# Patient Record
Sex: MALE | Race: WHITE | NOT HISPANIC OR LATINO | Employment: OTHER
[De-identification: names, ages, dates, MRNs, and addresses within clinical notes are randomized per-mention and may not be internally consistent; named-entity substitution may affect disease eponyms.]

---

## 2017-02-13 ENCOUNTER — RECORDS - HEALTHEAST (OUTPATIENT)
Dept: ADMINISTRATIVE | Facility: OTHER | Age: 67
End: 2017-02-13

## 2017-02-14 ENCOUNTER — RECORDS - HEALTHEAST (OUTPATIENT)
Dept: ADMINISTRATIVE | Facility: OTHER | Age: 67
End: 2017-02-14

## 2017-04-21 ENCOUNTER — RECORDS - HEALTHEAST (OUTPATIENT)
Dept: ADMINISTRATIVE | Facility: OTHER | Age: 67
End: 2017-04-21

## 2017-06-26 ENCOUNTER — AMBULATORY - HEALTHEAST (OUTPATIENT)
Dept: INFUSION THERAPY | Facility: CLINIC | Age: 67
End: 2017-06-26

## 2017-06-26 ENCOUNTER — OFFICE VISIT - HEALTHEAST (OUTPATIENT)
Dept: ONCOLOGY | Facility: CLINIC | Age: 67
End: 2017-06-26

## 2017-06-26 DIAGNOSIS — C91.10 CHRONIC LYMPHOID LEUKEMIA (H): ICD-10-CM

## 2017-06-26 DIAGNOSIS — C91.10 CHRONIC LYMPHOCYTIC LEUKEMIA (H): ICD-10-CM

## 2017-06-26 ASSESSMENT — MIFFLIN-ST. JEOR: SCORE: 1708.82

## 2017-07-03 ENCOUNTER — RECORDS - HEALTHEAST (OUTPATIENT)
Dept: ADMINISTRATIVE | Facility: OTHER | Age: 67
End: 2017-07-03

## 2017-08-29 ENCOUNTER — RECORDS - HEALTHEAST (OUTPATIENT)
Dept: ADMINISTRATIVE | Facility: OTHER | Age: 67
End: 2017-08-29

## 2017-09-21 ENCOUNTER — RECORDS - HEALTHEAST (OUTPATIENT)
Dept: ADMINISTRATIVE | Facility: OTHER | Age: 67
End: 2017-09-21

## 2017-11-29 ENCOUNTER — RECORDS - HEALTHEAST (OUTPATIENT)
Dept: ADMINISTRATIVE | Facility: OTHER | Age: 67
End: 2017-11-29

## 2018-01-15 ENCOUNTER — OFFICE VISIT - HEALTHEAST (OUTPATIENT)
Dept: ONCOLOGY | Facility: CLINIC | Age: 68
End: 2018-01-15

## 2018-01-15 ENCOUNTER — AMBULATORY - HEALTHEAST (OUTPATIENT)
Dept: INFUSION THERAPY | Facility: CLINIC | Age: 68
End: 2018-01-15

## 2018-01-15 DIAGNOSIS — C91.10 CHRONIC LYMPHOCYTIC LEUKEMIA (H): ICD-10-CM

## 2018-01-15 DIAGNOSIS — C91.10 CLL (CHRONIC LYMPHOCYTIC LEUKEMIA) (H): ICD-10-CM

## 2018-01-15 LAB
ALBUMIN SERPL-MCNC: 3.6 G/DL (ref 3.5–5)
ALP SERPL-CCNC: 82 U/L (ref 45–120)
ALT SERPL W P-5'-P-CCNC: 19 U/L (ref 0–45)
ANION GAP SERPL CALCULATED.3IONS-SCNC: 7 MMOL/L (ref 5–18)
AST SERPL W P-5'-P-CCNC: 14 U/L (ref 0–40)
BASOPHILS # BLD AUTO: 0 THOU/UL (ref 0–0.2)
BASOPHILS NFR BLD AUTO: 0 % (ref 0–2)
BILIRUB SERPL-MCNC: 0.5 MG/DL (ref 0–1)
BUN SERPL-MCNC: 10 MG/DL (ref 8–22)
CALCIUM SERPL-MCNC: 9.9 MG/DL (ref 8.5–10.5)
CHLORIDE BLD-SCNC: 103 MMOL/L (ref 98–107)
CO2 SERPL-SCNC: 28 MMOL/L (ref 22–31)
CREAT SERPL-MCNC: 0.76 MG/DL (ref 0.7–1.3)
EOSINOPHIL COUNT (ABSOLUTE): 0 THOU/UL (ref 0–0.4)
EOSINOPHIL NFR BLD AUTO: 0 % (ref 0–6)
ERYTHROCYTE [DISTWIDTH] IN BLOOD BY AUTOMATED COUNT: 14.2 % (ref 11–14.5)
GFR SERPL CREATININE-BSD FRML MDRD: >60 ML/MIN/1.73M2
GLUCOSE BLD-MCNC: 99 MG/DL (ref 70–125)
HCT VFR BLD AUTO: 37.8 % (ref 40–54)
HGB BLD-MCNC: 12.1 G/DL (ref 14–18)
LYMPHOCYTES # BLD AUTO: 31.1 THOU/UL (ref 0.8–4.4)
LYMPHOCYTES NFR BLD AUTO: 80 % (ref 20–40)
MCH RBC QN AUTO: 29.5 PG (ref 27–34)
MCHC RBC AUTO-ENTMCNC: 32 G/DL (ref 32–36)
MCV RBC AUTO: 92 FL (ref 80–100)
MONOCYTES # BLD AUTO: 1.2 THOU/UL (ref 0–0.9)
MONOCYTES NFR BLD AUTO: 3 % (ref 2–10)
PLAT MORPH BLD: NORMAL
PLATELET # BLD AUTO: 181 THOU/UL (ref 140–440)
PMV BLD AUTO: 9.1 FL (ref 8.5–12.5)
POTASSIUM BLD-SCNC: 4.6 MMOL/L (ref 3.5–5)
PROT SERPL-MCNC: 6.7 G/DL (ref 6–8)
RBC # BLD AUTO: 4.1 MILL/UL (ref 4.4–6.2)
REACTIVE LYMPHS: ABNORMAL
SODIUM SERPL-SCNC: 138 MMOL/L (ref 136–145)
TOTAL NEUTROPHILS-ABS(DIFF): 6.6 THOU/UL (ref 2–7.7)
TOTAL NEUTROPHILS-REL(DIFF): 17 % (ref 50–70)
WBC: 38.9 THOU/UL (ref 4–11)

## 2018-01-29 ENCOUNTER — RECORDS - HEALTHEAST (OUTPATIENT)
Dept: ADMINISTRATIVE | Facility: OTHER | Age: 68
End: 2018-01-29

## 2018-02-13 ENCOUNTER — RECORDS - HEALTHEAST (OUTPATIENT)
Dept: ADMINISTRATIVE | Facility: OTHER | Age: 68
End: 2018-02-13

## 2018-03-13 ENCOUNTER — RECORDS - HEALTHEAST (OUTPATIENT)
Dept: ADMINISTRATIVE | Facility: OTHER | Age: 68
End: 2018-03-13

## 2018-06-18 ENCOUNTER — OFFICE VISIT - HEALTHEAST (OUTPATIENT)
Dept: FAMILY MEDICINE | Facility: CLINIC | Age: 68
End: 2018-06-18

## 2018-06-18 DIAGNOSIS — H61.23 BILATERAL IMPACTED CERUMEN: ICD-10-CM

## 2018-06-18 ASSESSMENT — MIFFLIN-ST. JEOR: SCORE: 1706.04

## 2018-06-25 ENCOUNTER — OFFICE VISIT - HEALTHEAST (OUTPATIENT)
Dept: ONCOLOGY | Facility: CLINIC | Age: 68
End: 2018-06-25

## 2018-06-25 ENCOUNTER — AMBULATORY - HEALTHEAST (OUTPATIENT)
Dept: INFUSION THERAPY | Facility: CLINIC | Age: 68
End: 2018-06-25

## 2018-06-25 DIAGNOSIS — C91.10 CHRONIC LYMPHOCYTIC LEUKEMIA (H): ICD-10-CM

## 2018-06-25 LAB
ALBUMIN SERPL-MCNC: 4.1 G/DL (ref 3.5–5)
ALP SERPL-CCNC: 89 U/L (ref 45–120)
ALT SERPL W P-5'-P-CCNC: 15 U/L (ref 0–45)
ANION GAP SERPL CALCULATED.3IONS-SCNC: 8 MMOL/L (ref 5–18)
AST SERPL W P-5'-P-CCNC: 15 U/L (ref 0–40)
BASOPHILS # BLD AUTO: 0 THOU/UL (ref 0–0.2)
BASOPHILS NFR BLD AUTO: 0 % (ref 0–2)
BILIRUB SERPL-MCNC: 0.4 MG/DL (ref 0–1)
BUN SERPL-MCNC: 11 MG/DL (ref 8–22)
CALCIUM SERPL-MCNC: 10.1 MG/DL (ref 8.5–10.5)
CHLORIDE BLD-SCNC: 99 MMOL/L (ref 98–107)
CO2 SERPL-SCNC: 28 MMOL/L (ref 22–31)
CREAT SERPL-MCNC: 0.9 MG/DL (ref 0.7–1.3)
EOSINOPHIL COUNT (ABSOLUTE): 0.5 THOU/UL (ref 0–0.4)
EOSINOPHIL NFR BLD AUTO: 1 % (ref 0–6)
ERYTHROCYTE [DISTWIDTH] IN BLOOD BY AUTOMATED COUNT: 14.3 % (ref 11–14.5)
GFR SERPL CREATININE-BSD FRML MDRD: >60 ML/MIN/1.73M2
GLUCOSE BLD-MCNC: 91 MG/DL (ref 70–125)
HCT VFR BLD AUTO: 39.6 % (ref 40–54)
HGB BLD-MCNC: 12.8 G/DL (ref 14–18)
LYMPHOCYTES # BLD AUTO: 36 THOU/UL (ref 0.8–4.4)
LYMPHOCYTES NFR BLD AUTO: 71 % (ref 20–40)
MCH RBC QN AUTO: 29.2 PG (ref 27–34)
MCHC RBC AUTO-ENTMCNC: 32.3 G/DL (ref 32–36)
MCV RBC AUTO: 90 FL (ref 80–100)
MONOCYTES # BLD AUTO: 1.5 THOU/UL (ref 0–0.9)
MONOCYTES NFR BLD AUTO: 3 % (ref 2–10)
PLAT MORPH BLD: NORMAL
PLATELET # BLD AUTO: 197 THOU/UL (ref 140–440)
PMV BLD AUTO: 9.1 FL (ref 8.5–12.5)
POTASSIUM BLD-SCNC: 4.3 MMOL/L (ref 3.5–5)
PROT SERPL-MCNC: 6.9 G/DL (ref 6–8)
RBC # BLD AUTO: 4.38 MILL/UL (ref 4.4–6.2)
SODIUM SERPL-SCNC: 135 MMOL/L (ref 136–145)
TOTAL NEUTROPHILS-ABS(DIFF): 12.7 THOU/UL (ref 2–7.7)
TOTAL NEUTROPHILS-REL(DIFF): 25 % (ref 50–70)
WBC: 50.7 THOU/UL (ref 4–11)

## 2018-06-25 ASSESSMENT — MIFFLIN-ST. JEOR: SCORE: 1702.93

## 2018-06-26 ENCOUNTER — RECORDS - HEALTHEAST (OUTPATIENT)
Dept: ADMINISTRATIVE | Facility: OTHER | Age: 68
End: 2018-06-26

## 2018-06-26 ENCOUNTER — COMMUNICATION - HEALTHEAST (OUTPATIENT)
Dept: ONCOLOGY | Facility: CLINIC | Age: 68
End: 2018-06-26

## 2018-06-26 ENCOUNTER — COMMUNICATION - HEALTHEAST (OUTPATIENT)
Dept: ADMINISTRATIVE | Facility: HOSPITAL | Age: 68
End: 2018-06-26

## 2018-07-11 ENCOUNTER — OFFICE VISIT - HEALTHEAST (OUTPATIENT)
Dept: FAMILY MEDICINE | Facility: CLINIC | Age: 68
End: 2018-07-11

## 2018-07-11 DIAGNOSIS — C91.10 CHRONIC LYMPHOCYTIC LEUKEMIA (H): ICD-10-CM

## 2018-07-11 DIAGNOSIS — G35 MULTIPLE SCLEROSIS (H): ICD-10-CM

## 2018-07-11 DIAGNOSIS — Z13.220 LIPID SCREENING: ICD-10-CM

## 2018-07-11 DIAGNOSIS — K52.831 COLLAGENOUS COLITIS: ICD-10-CM

## 2018-07-11 DIAGNOSIS — Z23 NEED FOR VACCINATION: ICD-10-CM

## 2018-07-11 DIAGNOSIS — I10 BENIGN ESSENTIAL HYPERTENSION: ICD-10-CM

## 2018-07-11 LAB
ALBUMIN UR-MCNC: NEGATIVE MG/DL
AMORPH CRY #/AREA URNS HPF: ABNORMAL /[HPF]
APPEARANCE UR: CLEAR
BACTERIA #/AREA URNS HPF: ABNORMAL HPF
BILIRUB UR QL STRIP: NEGATIVE
CHOLEST SERPL-MCNC: 182 MG/DL
COLOR UR AUTO: YELLOW
FASTING STATUS PATIENT QL REPORTED: YES
GLUCOSE UR STRIP-MCNC: NEGATIVE MG/DL
HDLC SERPL-MCNC: 78 MG/DL
HGB UR QL STRIP: ABNORMAL
KETONES UR STRIP-MCNC: NEGATIVE MG/DL
LDLC SERPL CALC-MCNC: 93 MG/DL
LEUKOCYTE ESTERASE UR QL STRIP: NEGATIVE
NITRATE UR QL: NEGATIVE
PH UR STRIP: 7.5 [PH] (ref 5–8)
RBC #/AREA URNS AUTO: ABNORMAL HPF
SP GR UR STRIP: 1.01 (ref 1–1.03)
SQUAMOUS #/AREA URNS AUTO: ABNORMAL LPF
TRIGL SERPL-MCNC: 55 MG/DL
TSH SERPL DL<=0.005 MIU/L-ACNC: 1.35 UIU/ML (ref 0.3–5)
UROBILINOGEN UR STRIP-ACNC: ABNORMAL
WBC #/AREA URNS AUTO: ABNORMAL HPF

## 2018-07-11 ASSESSMENT — MIFFLIN-ST. JEOR: SCORE: 1721.07

## 2018-07-12 ENCOUNTER — COMMUNICATION - HEALTHEAST (OUTPATIENT)
Dept: FAMILY MEDICINE | Facility: CLINIC | Age: 68
End: 2018-07-12

## 2018-07-18 ENCOUNTER — COMMUNICATION - HEALTHEAST (OUTPATIENT)
Dept: FAMILY MEDICINE | Facility: CLINIC | Age: 68
End: 2018-07-18

## 2018-08-13 ENCOUNTER — OFFICE VISIT - HEALTHEAST (OUTPATIENT)
Dept: FAMILY MEDICINE | Facility: CLINIC | Age: 68
End: 2018-08-13

## 2018-08-13 DIAGNOSIS — I10 BENIGN ESSENTIAL HYPERTENSION: ICD-10-CM

## 2018-09-25 ENCOUNTER — RECORDS - HEALTHEAST (OUTPATIENT)
Dept: ADMINISTRATIVE | Facility: OTHER | Age: 68
End: 2018-09-25

## 2018-11-14 ENCOUNTER — OFFICE VISIT - HEALTHEAST (OUTPATIENT)
Dept: FAMILY MEDICINE | Facility: CLINIC | Age: 68
End: 2018-11-14

## 2018-11-14 DIAGNOSIS — I10 BENIGN ESSENTIAL HYPERTENSION: ICD-10-CM

## 2018-11-14 LAB
ANION GAP SERPL CALCULATED.3IONS-SCNC: 8 MMOL/L (ref 5–18)
BUN SERPL-MCNC: 10 MG/DL (ref 8–22)
CALCIUM SERPL-MCNC: 10 MG/DL (ref 8.5–10.5)
CHLORIDE BLD-SCNC: 100 MMOL/L (ref 98–107)
CO2 SERPL-SCNC: 27 MMOL/L (ref 22–31)
CREAT SERPL-MCNC: 0.79 MG/DL (ref 0.7–1.3)
GFR SERPL CREATININE-BSD FRML MDRD: >60 ML/MIN/1.73M2
GLUCOSE BLD-MCNC: 93 MG/DL (ref 70–125)
POTASSIUM BLD-SCNC: 4.2 MMOL/L (ref 3.5–5)
SODIUM SERPL-SCNC: 135 MMOL/L (ref 136–145)

## 2019-01-07 ENCOUNTER — OFFICE VISIT - HEALTHEAST (OUTPATIENT)
Dept: ONCOLOGY | Facility: CLINIC | Age: 69
End: 2019-01-07

## 2019-01-07 ENCOUNTER — AMBULATORY - HEALTHEAST (OUTPATIENT)
Dept: INFUSION THERAPY | Facility: CLINIC | Age: 69
End: 2019-01-07

## 2019-01-07 DIAGNOSIS — C91.10 CHRONIC LYMPHOCYTIC LEUKEMIA (H): ICD-10-CM

## 2019-01-07 LAB
ALBUMIN SERPL-MCNC: 3.8 G/DL (ref 3.5–5)
ALP SERPL-CCNC: 84 U/L (ref 45–120)
ALT SERPL W P-5'-P-CCNC: 15 U/L (ref 0–45)
ANION GAP SERPL CALCULATED.3IONS-SCNC: 4 MMOL/L (ref 5–18)
AST SERPL W P-5'-P-CCNC: 15 U/L (ref 0–40)
BASOPHILS # BLD AUTO: 0 THOU/UL (ref 0–0.2)
BASOPHILS NFR BLD AUTO: 0 % (ref 0–2)
BILIRUB SERPL-MCNC: 0.4 MG/DL (ref 0–1)
BUN SERPL-MCNC: 10 MG/DL (ref 8–22)
CALCIUM SERPL-MCNC: 9.8 MG/DL (ref 8.5–10.5)
CHLORIDE BLD-SCNC: 101 MMOL/L (ref 98–107)
CO2 SERPL-SCNC: 30 MMOL/L (ref 22–31)
CREAT SERPL-MCNC: 0.8 MG/DL (ref 0.7–1.3)
EOSINOPHIL COUNT (ABSOLUTE): 0 THOU/UL (ref 0–0.4)
EOSINOPHIL NFR BLD AUTO: 0 % (ref 0–6)
ERYTHROCYTE [DISTWIDTH] IN BLOOD BY AUTOMATED COUNT: 14 % (ref 11–14.5)
GFR SERPL CREATININE-BSD FRML MDRD: >60 ML/MIN/1.73M2
GLUCOSE BLD-MCNC: 100 MG/DL (ref 70–125)
HCT VFR BLD AUTO: 36.7 % (ref 40–54)
HGB BLD-MCNC: 11.5 G/DL (ref 14–18)
LYMPHOCYTES # BLD AUTO: 31.8 THOU/UL (ref 0.8–4.4)
LYMPHOCYTES NFR BLD AUTO: 81 % (ref 20–40)
MCH RBC QN AUTO: 28.5 PG (ref 27–34)
MCHC RBC AUTO-ENTMCNC: 31.3 G/DL (ref 32–36)
MCV RBC AUTO: 91 FL (ref 80–100)
MONOCYTES # BLD AUTO: 2 THOU/UL (ref 0–0.9)
MONOCYTES NFR BLD AUTO: 5 % (ref 2–10)
PLAT MORPH BLD: NORMAL
PLATELET # BLD AUTO: 184 THOU/UL (ref 140–440)
PMV BLD AUTO: 9 FL (ref 8.5–12.5)
POTASSIUM BLD-SCNC: 4.2 MMOL/L (ref 3.5–5)
PROT SERPL-MCNC: 6.4 G/DL (ref 6–8)
RBC # BLD AUTO: 4.04 MILL/UL (ref 4.4–6.2)
SODIUM SERPL-SCNC: 135 MMOL/L (ref 136–145)
TOTAL NEUTROPHILS-ABS(DIFF): 5.5 THOU/UL (ref 2–7.7)
TOTAL NEUTROPHILS-REL(DIFF): 14 % (ref 50–70)
WBC: 39.3 THOU/UL (ref 4–11)

## 2019-01-18 ENCOUNTER — RECORDS - HEALTHEAST (OUTPATIENT)
Dept: ADMINISTRATIVE | Facility: OTHER | Age: 69
End: 2019-01-18

## 2019-03-12 ENCOUNTER — COMMUNICATION - HEALTHEAST (OUTPATIENT)
Dept: FAMILY MEDICINE | Facility: CLINIC | Age: 69
End: 2019-03-12

## 2019-03-12 DIAGNOSIS — I10 BENIGN ESSENTIAL HYPERTENSION: ICD-10-CM

## 2019-05-15 ENCOUNTER — OFFICE VISIT - HEALTHEAST (OUTPATIENT)
Dept: FAMILY MEDICINE | Facility: CLINIC | Age: 69
End: 2019-05-15

## 2019-05-15 DIAGNOSIS — G35 MULTIPLE SCLEROSIS (H): ICD-10-CM

## 2019-05-15 DIAGNOSIS — R35.1 BENIGN PROSTATIC HYPERPLASIA WITH NOCTURIA: ICD-10-CM

## 2019-05-15 DIAGNOSIS — R35.1 NOCTURIA: ICD-10-CM

## 2019-05-15 DIAGNOSIS — I10 BENIGN ESSENTIAL HYPERTENSION: ICD-10-CM

## 2019-05-15 DIAGNOSIS — C91.10 CHRONIC LYMPHOCYTIC LEUKEMIA (H): ICD-10-CM

## 2019-05-15 DIAGNOSIS — N40.1 BENIGN PROSTATIC HYPERPLASIA WITH NOCTURIA: ICD-10-CM

## 2019-05-15 LAB
ANION GAP SERPL CALCULATED.3IONS-SCNC: 10 MMOL/L (ref 5–18)
BUN SERPL-MCNC: 9 MG/DL (ref 8–22)
CALCIUM SERPL-MCNC: 10.6 MG/DL (ref 8.5–10.5)
CHLORIDE BLD-SCNC: 99 MMOL/L (ref 98–107)
CO2 SERPL-SCNC: 26 MMOL/L (ref 22–31)
CREAT SERPL-MCNC: 0.85 MG/DL (ref 0.7–1.3)
GFR SERPL CREATININE-BSD FRML MDRD: >60 ML/MIN/1.73M2
GLUCOSE BLD-MCNC: 92 MG/DL (ref 70–125)
POTASSIUM BLD-SCNC: 4.3 MMOL/L (ref 3.5–5)
SODIUM SERPL-SCNC: 135 MMOL/L (ref 136–145)

## 2019-05-15 ASSESSMENT — MIFFLIN-ST. JEOR: SCORE: 1693.86

## 2019-05-16 ENCOUNTER — COMMUNICATION - HEALTHEAST (OUTPATIENT)
Dept: FAMILY MEDICINE | Facility: CLINIC | Age: 69
End: 2019-05-16

## 2019-06-27 ENCOUNTER — AMBULATORY - HEALTHEAST (OUTPATIENT)
Dept: FAMILY MEDICINE | Facility: CLINIC | Age: 69
End: 2019-06-27

## 2019-06-27 DIAGNOSIS — L98.9 SKIN LESION: ICD-10-CM

## 2019-06-27 ASSESSMENT — MIFFLIN-ST. JEOR: SCORE: 1719.93

## 2019-07-01 ENCOUNTER — RECORDS - HEALTHEAST (OUTPATIENT)
Dept: ADMINISTRATIVE | Facility: OTHER | Age: 69
End: 2019-07-01

## 2019-07-01 LAB
LAB AP CHARGES (HE HISTORICAL CONVERSION): NORMAL
PATH REPORT.COMMENTS IMP SPEC: NORMAL
PATH REPORT.FINAL DX SPEC: NORMAL
PATH REPORT.GROSS SPEC: NORMAL
PATH REPORT.MICROSCOPIC SPEC OTHER STN: NORMAL
PATH REPORT.RELEVANT HX SPEC: NORMAL
RESULT FLAG (HE HISTORICAL CONVERSION): NORMAL

## 2019-07-29 ENCOUNTER — AMBULATORY - HEALTHEAST (OUTPATIENT)
Dept: INFUSION THERAPY | Facility: CLINIC | Age: 69
End: 2019-07-29

## 2019-07-29 ENCOUNTER — OFFICE VISIT - HEALTHEAST (OUTPATIENT)
Dept: ONCOLOGY | Facility: CLINIC | Age: 69
End: 2019-07-29

## 2019-07-29 DIAGNOSIS — C91.10 CHRONIC LYMPHOCYTIC LEUKEMIA (H): ICD-10-CM

## 2019-07-29 DIAGNOSIS — C91.10 CLL (CHRONIC LYMPHOCYTIC LEUKEMIA) (H): ICD-10-CM

## 2019-07-29 LAB
ALBUMIN SERPL-MCNC: 3.9 G/DL (ref 3.5–5)
ALP SERPL-CCNC: 94 U/L (ref 45–120)
ALT SERPL W P-5'-P-CCNC: 14 U/L (ref 0–45)
ANION GAP SERPL CALCULATED.3IONS-SCNC: 9 MMOL/L (ref 5–18)
AST SERPL W P-5'-P-CCNC: 13 U/L (ref 0–40)
BASOPHILS # BLD AUTO: 0 THOU/UL (ref 0–0.2)
BASOPHILS NFR BLD AUTO: 0 % (ref 0–2)
BILIRUB SERPL-MCNC: 0.5 MG/DL (ref 0–1)
BUN SERPL-MCNC: 13 MG/DL (ref 8–22)
CALCIUM SERPL-MCNC: 10.2 MG/DL (ref 8.5–10.5)
CHLORIDE BLD-SCNC: 101 MMOL/L (ref 98–107)
CO2 SERPL-SCNC: 27 MMOL/L (ref 22–31)
CREAT SERPL-MCNC: 0.82 MG/DL (ref 0.7–1.3)
EOSINOPHIL COUNT (ABSOLUTE): 0 THOU/UL (ref 0–0.4)
EOSINOPHIL NFR BLD AUTO: 0 % (ref 0–6)
ERYTHROCYTE [DISTWIDTH] IN BLOOD BY AUTOMATED COUNT: 14.4 % (ref 11–14.5)
GFR SERPL CREATININE-BSD FRML MDRD: >60 ML/MIN/1.73M2
GLUCOSE BLD-MCNC: 94 MG/DL (ref 70–125)
HCT VFR BLD AUTO: 37.9 % (ref 40–54)
HGB BLD-MCNC: 11.6 G/DL (ref 14–18)
LYMPHOCYTES # BLD AUTO: 36.2 THOU/UL (ref 0.8–4.4)
LYMPHOCYTES NFR BLD AUTO: 80 % (ref 20–40)
MCH RBC QN AUTO: 28.4 PG (ref 27–34)
MCHC RBC AUTO-ENTMCNC: 30.6 G/DL (ref 32–36)
MCV RBC AUTO: 93 FL (ref 80–100)
MONOCYTES # BLD AUTO: 2.7 THOU/UL (ref 0–0.9)
MONOCYTES NFR BLD AUTO: 6 % (ref 2–10)
PLAT MORPH BLD: NORMAL
PLATELET # BLD AUTO: 191 THOU/UL (ref 140–440)
PMV BLD AUTO: 8.9 FL (ref 8.5–12.5)
POTASSIUM BLD-SCNC: 4.8 MMOL/L (ref 3.5–5)
PROT SERPL-MCNC: 6.4 G/DL (ref 6–8)
RBC # BLD AUTO: 4.09 MILL/UL (ref 4.4–6.2)
SODIUM SERPL-SCNC: 137 MMOL/L (ref 136–145)
TOTAL NEUTROPHILS-ABS(DIFF): 6.3 THOU/UL (ref 2–7.7)
TOTAL NEUTROPHILS-REL(DIFF): 14 % (ref 50–70)
WBC: 45.2 THOU/UL (ref 4–11)

## 2019-08-07 ENCOUNTER — COMMUNICATION - HEALTHEAST (OUTPATIENT)
Dept: FAMILY MEDICINE | Facility: CLINIC | Age: 69
End: 2019-08-07

## 2019-08-07 DIAGNOSIS — N40.1 BENIGN PROSTATIC HYPERPLASIA WITH NOCTURIA: ICD-10-CM

## 2019-08-07 DIAGNOSIS — R35.1 BENIGN PROSTATIC HYPERPLASIA WITH NOCTURIA: ICD-10-CM

## 2019-08-07 DIAGNOSIS — R35.1 NOCTURIA: ICD-10-CM

## 2019-08-21 ENCOUNTER — COMMUNICATION - HEALTHEAST (OUTPATIENT)
Dept: SCHEDULING | Facility: CLINIC | Age: 69
End: 2019-08-21

## 2019-10-30 ENCOUNTER — COMMUNICATION - HEALTHEAST (OUTPATIENT)
Dept: FAMILY MEDICINE | Facility: CLINIC | Age: 69
End: 2019-10-30

## 2019-10-30 DIAGNOSIS — R35.1 NOCTURIA: ICD-10-CM

## 2019-10-30 DIAGNOSIS — R35.1 BENIGN PROSTATIC HYPERPLASIA WITH NOCTURIA: ICD-10-CM

## 2019-10-30 DIAGNOSIS — N40.1 BENIGN PROSTATIC HYPERPLASIA WITH NOCTURIA: ICD-10-CM

## 2019-11-13 ENCOUNTER — OFFICE VISIT - HEALTHEAST (OUTPATIENT)
Dept: FAMILY MEDICINE | Facility: CLINIC | Age: 69
End: 2019-11-13

## 2019-11-13 DIAGNOSIS — R35.1 BENIGN PROSTATIC HYPERPLASIA WITH NOCTURIA: ICD-10-CM

## 2019-11-13 DIAGNOSIS — I10 BENIGN ESSENTIAL HYPERTENSION: ICD-10-CM

## 2019-11-13 DIAGNOSIS — N40.1 BENIGN PROSTATIC HYPERPLASIA WITH NOCTURIA: ICD-10-CM

## 2019-11-13 DIAGNOSIS — G35 MULTIPLE SCLEROSIS (H): ICD-10-CM

## 2019-11-13 DIAGNOSIS — Z11.59 NEED FOR HEPATITIS C SCREENING TEST: ICD-10-CM

## 2019-11-13 LAB
ANION GAP SERPL CALCULATED.3IONS-SCNC: 9 MMOL/L (ref 5–18)
BUN SERPL-MCNC: 14 MG/DL (ref 8–22)
CALCIUM SERPL-MCNC: 10.1 MG/DL (ref 8.5–10.5)
CHLORIDE BLD-SCNC: 101 MMOL/L (ref 98–107)
CO2 SERPL-SCNC: 27 MMOL/L (ref 22–31)
CREAT SERPL-MCNC: 0.9 MG/DL (ref 0.7–1.3)
GFR SERPL CREATININE-BSD FRML MDRD: >60 ML/MIN/1.73M2
GLUCOSE BLD-MCNC: 88 MG/DL (ref 70–125)
POTASSIUM BLD-SCNC: 5.1 MMOL/L (ref 3.5–5)
SODIUM SERPL-SCNC: 137 MMOL/L (ref 136–145)

## 2019-11-14 LAB — HCV AB SERPL QL IA: NEGATIVE

## 2019-12-29 ENCOUNTER — RECORDS - HEALTHEAST (OUTPATIENT)
Dept: ADMINISTRATIVE | Facility: OTHER | Age: 69
End: 2019-12-29

## 2020-01-15 ENCOUNTER — RECORDS - HEALTHEAST (OUTPATIENT)
Dept: ADMINISTRATIVE | Facility: OTHER | Age: 70
End: 2020-01-15

## 2020-01-27 ENCOUNTER — AMBULATORY - HEALTHEAST (OUTPATIENT)
Dept: INFUSION THERAPY | Facility: CLINIC | Age: 70
End: 2020-01-27

## 2020-01-27 ENCOUNTER — OFFICE VISIT - HEALTHEAST (OUTPATIENT)
Dept: ONCOLOGY | Facility: CLINIC | Age: 70
End: 2020-01-27

## 2020-01-27 DIAGNOSIS — C91.10 CHRONIC LYMPHOCYTIC LEUKEMIA (H): ICD-10-CM

## 2020-01-27 DIAGNOSIS — C91.10 CLL (CHRONIC LYMPHOCYTIC LEUKEMIA) (H): ICD-10-CM

## 2020-01-27 LAB
ALBUMIN SERPL-MCNC: 3.5 G/DL (ref 3.5–5)
ALP SERPL-CCNC: 102 U/L (ref 45–120)
ALT SERPL W P-5'-P-CCNC: 16 U/L (ref 0–45)
ANION GAP SERPL CALCULATED.3IONS-SCNC: 9 MMOL/L (ref 5–18)
AST SERPL W P-5'-P-CCNC: 14 U/L (ref 0–40)
BASOPHILS # BLD AUTO: 0 THOU/UL (ref 0–0.2)
BASOPHILS NFR BLD AUTO: 0 % (ref 0–2)
BILIRUB SERPL-MCNC: 0.4 MG/DL (ref 0–1)
BUN SERPL-MCNC: 12 MG/DL (ref 8–22)
CALCIUM SERPL-MCNC: 9.2 MG/DL (ref 8.5–10.5)
CHLORIDE BLD-SCNC: 96 MMOL/L (ref 98–107)
CO2 SERPL-SCNC: 27 MMOL/L (ref 22–31)
CREAT SERPL-MCNC: 0.79 MG/DL (ref 0.7–1.3)
EOSINOPHIL COUNT (ABSOLUTE): 0 THOU/UL (ref 0–0.4)
EOSINOPHIL NFR BLD AUTO: 0 % (ref 0–6)
ERYTHROCYTE [DISTWIDTH] IN BLOOD BY AUTOMATED COUNT: 14.3 % (ref 11–14.5)
GFR SERPL CREATININE-BSD FRML MDRD: >60 ML/MIN/1.73M2
GLUCOSE BLD-MCNC: 118 MG/DL (ref 70–125)
HCT VFR BLD AUTO: 37 % (ref 40–54)
HGB BLD-MCNC: 11.5 G/DL (ref 14–18)
LYMPHOCYTES # BLD AUTO: 44.5 THOU/UL (ref 0.8–4.4)
LYMPHOCYTES NFR BLD AUTO: 81 % (ref 20–40)
MCH RBC QN AUTO: 28.5 PG (ref 27–34)
MCHC RBC AUTO-ENTMCNC: 31.1 G/DL (ref 32–36)
MCV RBC AUTO: 92 FL (ref 80–100)
MONOCYTES # BLD AUTO: 0.5 THOU/UL (ref 0–0.9)
MONOCYTES NFR BLD AUTO: 1 % (ref 2–10)
PLAT MORPH BLD: NORMAL
PLATELET # BLD AUTO: 214 THOU/UL (ref 140–440)
PMV BLD AUTO: 8.5 FL (ref 8.5–12.5)
POTASSIUM BLD-SCNC: 4.7 MMOL/L (ref 3.5–5)
PROT SERPL-MCNC: 6.5 G/DL (ref 6–8)
RBC # BLD AUTO: 4.04 MILL/UL (ref 4.4–6.2)
SODIUM SERPL-SCNC: 132 MMOL/L (ref 136–145)
TOTAL NEUTROPHILS-ABS(DIFF): 9.9 THOU/UL (ref 2–7.7)
TOTAL NEUTROPHILS-REL(DIFF): 18 % (ref 50–70)
WBC: 54.9 THOU/UL (ref 4–11)

## 2020-02-02 ENCOUNTER — COMMUNICATION - HEALTHEAST (OUTPATIENT)
Dept: FAMILY MEDICINE | Facility: CLINIC | Age: 70
End: 2020-02-02

## 2020-02-02 DIAGNOSIS — R35.1 BENIGN PROSTATIC HYPERPLASIA WITH NOCTURIA: ICD-10-CM

## 2020-02-02 DIAGNOSIS — R35.1 NOCTURIA: ICD-10-CM

## 2020-02-02 DIAGNOSIS — N40.1 BENIGN PROSTATIC HYPERPLASIA WITH NOCTURIA: ICD-10-CM

## 2020-04-27 ENCOUNTER — OFFICE VISIT - HEALTHEAST (OUTPATIENT)
Dept: FAMILY MEDICINE | Facility: CLINIC | Age: 70
End: 2020-04-27

## 2020-04-27 DIAGNOSIS — B02.9 HERPES ZOSTER WITHOUT COMPLICATION: ICD-10-CM

## 2020-05-20 ENCOUNTER — OFFICE VISIT - HEALTHEAST (OUTPATIENT)
Dept: FAMILY MEDICINE | Facility: CLINIC | Age: 70
End: 2020-05-20

## 2020-05-20 DIAGNOSIS — B02.9 HERPES ZOSTER WITHOUT COMPLICATION: ICD-10-CM

## 2020-05-20 DIAGNOSIS — I10 BENIGN ESSENTIAL HYPERTENSION: ICD-10-CM

## 2020-06-06 ENCOUNTER — RECORDS - HEALTHEAST (OUTPATIENT)
Dept: ADMINISTRATIVE | Facility: OTHER | Age: 70
End: 2020-06-06

## 2020-06-09 ENCOUNTER — RECORDS - HEALTHEAST (OUTPATIENT)
Dept: ADMINISTRATIVE | Facility: OTHER | Age: 70
End: 2020-06-09

## 2020-06-22 ENCOUNTER — RECORDS - HEALTHEAST (OUTPATIENT)
Dept: ADMINISTRATIVE | Facility: OTHER | Age: 70
End: 2020-06-22

## 2020-07-08 ENCOUNTER — OFFICE VISIT (OUTPATIENT)
Dept: NEUROLOGY | Facility: CLINIC | Age: 70
End: 2020-07-08
Payer: COMMERCIAL

## 2020-07-08 ENCOUNTER — RECORDS - HEALTHEAST (OUTPATIENT)
Dept: ADMINISTRATIVE | Facility: OTHER | Age: 70
End: 2020-07-08

## 2020-07-08 VITALS — WEIGHT: 195 LBS | BODY MASS INDEX: 26.41 KG/M2 | HEIGHT: 72 IN

## 2020-07-08 DIAGNOSIS — G35 MULTIPLE SCLEROSIS (H): Primary | ICD-10-CM

## 2020-07-08 PROBLEM — C91.10 CHRONIC LYMPHOCYTIC LEUKEMIA (H): Status: ACTIVE | Noted: 2020-07-08

## 2020-07-08 PROBLEM — K52.839 MICROSCOPIC COLITIS: Status: ACTIVE | Noted: 2020-07-08

## 2020-07-08 PROBLEM — G89.29 CHRONIC PAIN: Status: ACTIVE | Noted: 2020-07-08

## 2020-07-08 PROBLEM — I10 BENIGN ESSENTIAL HYPERTENSION: Status: ACTIVE | Noted: 2018-08-13

## 2020-07-08 PROBLEM — N40.0 BENIGN PROSTATIC HYPERPLASIA: Status: ACTIVE | Noted: 2020-07-08

## 2020-07-08 PROBLEM — K52.831 COLLAGENOUS COLITIS: Status: ACTIVE | Noted: 2017-02-20

## 2020-07-08 PROCEDURE — 99214 OFFICE O/P EST MOD 30 MIN: CPT | Mod: 95 | Performed by: PSYCHIATRY & NEUROLOGY

## 2020-07-08 RX ORDER — AMLODIPINE BESYLATE 5 MG/1
5 TABLET ORAL
COMMUNITY
Start: 2019-07-01

## 2020-07-08 RX ORDER — BUDESONIDE 3 MG/1
6 CAPSULE, COATED PELLETS ORAL EVERY MORNING
COMMUNITY

## 2020-07-08 RX ORDER — TAMSULOSIN HYDROCHLORIDE 0.4 MG/1
CAPSULE ORAL
COMMUNITY
Start: 2019-07-01

## 2020-07-08 SDOH — HEALTH STABILITY: MENTAL HEALTH: HOW OFTEN DO YOU HAVE A DRINK CONTAINING ALCOHOL?: MONTHLY OR LESS

## 2020-07-08 ASSESSMENT — MIFFLIN-ST. JEOR: SCORE: 1687.51

## 2020-07-08 NOTE — PATIENT INSTRUCTIONS
Patient Education     Understanding Multiple Sclerosis (MS)    Multiple sclerosis (MS) is a disease of the brain and spinal cord. Unfortunately, there is no cure for MS. But there are many treatments available. Many people with MS can manage their symptoms and lead active, healthy lives. Read on to learn more about MS and its treatments.  What is MS?  The brain is the body s control center. Each part of the brain controls specific functions. These include movement, balance, sensation, and reasoning. The brain controls these functions by sending and receiving messages through nerves. Nerves have a protective covering (myelin). With MS, the myelin on nerves in the brain and spinal cord is damaged. The loss of this covering causes messages traveling along affected nerves to slow or stop. This results in MS symptoms.  Causes and risk factors for MS  Experts don't know what causes MS. But most research suggests that the body s immune system attacks and destroys myelin by mistake. MS most often begins in adults between ages 20 and 40. It happens in women more often than men. It also is more likely to occur if a person has a family history of MS. Smoking does not cause MS, but can make it worse.    Types of MS  There are 4 main types of MS. These are:    Relapsing-remitting MS. This type of MS is the most common. It is marked by isolated episodes of symptoms (also called attacks or flare-ups). Periods of partial or complete recovery follow these episodes. Each attack may be worse than the one before it.    Primary-progressive MS. This type of MS is marked by a slow onset of symptoms that gradually worsen over time. There are no periods of recovery.    Secondary-progressive MS. This type of MS begins as relapsing-remitting MS. After a period of stability, the disease steadily gets worse. About 50% of people with relapsing-remitting MS have secondary-progressive MS within 10 years of their first attack. About all of them  have it within 25 years.    Progressive-relapsing MS. This type of MS includes both slowly progressive symptoms and periods of flare-ups together.   Symptoms of MS  MS symptoms vary from person to person. The type of symptoms a person has depends on which nerves are affected. It also depends on how much nerve damage there is in the brain and spinal cord. A person can also have different symptoms during the course of the disease. Symptoms can include:    Extreme tiredness (fatigue)    Numbness, tingling, or loss of feeling    Pain    Muscle spasms or weakness in the arms, legs, or both    Vision problems, such as rapid eye movements, double vision, or vision loss    Balance and coordination problems    Problems walking or moving the arms, legs, or both    Bowel and bladder control problems    Problems with sexual function    Dizziness    Trouble concentrating, focusing, or remembering things    Trouble reasoning and solving problems    Trouble speaking or swallowing    Depression  Diagnosing MS  MS can be hard to diagnose. Symptoms come and go. They also may seem like those of other health problems. A diagnosis of MS is not made unless a person has had at least 2 or more separate episodes of MS symptoms. To confirm a diagnosis of MS, your healthcare provider will take a detailed history of symptoms. He or she will also give you a neurologic exam to check your muscle strength, balance, coordination, and reflexes. Skills such as thinking, memory, vision, hearing, and talking are also checked. In addition, healthcare providers may also give you the following tests:    MRI. This test provides detailed pictures of the brain and spinal cord. It helps check for areas of damaged nerves. These are often referred to as lesions or plaques.    Visual evoked potentials. This test is done to see how well your optic nerves are working.    Spinal tap (also called lumbar puncture). This test checks the health of the fluid around your  brain and spinal cord for signs of nerve sheath damage (demyelination).    Blood tests. These help rule out other causes of the symptoms.  Treating MS  The goal of treatment is to manage your symptoms and slow the rate at which the disease worsens. You can manage your symptoms in 1 or more of the following ways:    Medicines. Some medicines help keep your body s immune system from attacking the myelin. This may reduce the frequency and severity of attacks. Other medicines help control symptoms or relieve pain when attacks happen.    Rehabilitation (rehab). Symptoms or problems due to MS can interfere with daily living. Rehab includes physical, occupational, or speech therapy. This can help you maintain strength and function. If needed, your healthcare provider will prescribe aids such as canes, walkers, or wheelchairs. You can also make changes in your work or living space to improve your safety.    Supportive services. These include counseling and support groups to help you cope with the challenges of living with MS. Family members and friends may also benefit from these services.    Lifestyle changes. Making certain changes in your lifestyle and daily routine may help you manage symptoms. This includes getting enough rest and regular exercise. It also includes eating healthy foods and reducing stress. It's helpful to figure out and stay away from things that trigger MS episodes.    Other treatments. Researchers are exploring new treatments for MS. Many of these are in clinical trials. This means they are being tested for safety and effectiveness. Your healthcare providers can give you more information about any treatments that might be an option for you.  Long-term concerns  MS is an unpredictable disease. Your experience will be different from other people's experiences. In general, if you have MS, you should have regular visits with your healthcare provider. He or she will watch your symptoms. Your healthcare  provider will review how well your medicines and other treatments work. MS symptoms may get worse as your disease progresses. If this happens, you may need more aggressive care and treatments. Visit the resources below to learn more about MS and what advances researchers are making to find a cure:    National Multiple Sclerosis Society, www.nationalmssociety.org    Multiple S[employer] Association of [place], www.mymsaa.org  Date Last Reviewed: 2/1/2018 2000-2019 [employer]. [address] [place] [address], [place] [address]. All rights reserved. This information is not intended as a substitute for professional medical care. Always follow your healthcare professional's instructions.

## 2020-07-08 NOTE — PROGRESS NOTES
NEUROLOGY FOLLOW UP VISIT  NOTE       [facility] NEUROLOGY [place]  [address] [place] [address]  Tel: [phone]  Fax: [phone]  [email or web address]     Shivam Jansen,  1950, MRN [id number]  PCP: Rock Hicks, [phone]  Date: 2020     ASSESSMENT & PLAN     Diagnosis code: Multiple sclerosis     Multiple sclerosis  70-year-old male with history of primary progressive multiple sclerosis diagnosed in  who returns for follow-up.  Although he was diagnosed with that condition, his condition has remained relatively stable off any medication.  Most recent imaging studies in 2017 were unchanged.  He takes Tylenol with codeine that helps him with muscle spasm and is requesting refill.  I plan on repeating imaging studies next year follow-up will be in 1 year.    Thank you again for this referral, please feel free to contact me if you have any questions.    Giorgio Weaver MD  [facility]  (Formerly, [facility]     HISTORY OF PRESENT ILLNESS     Patient is 70-year-old male with history of chronic lymphocytic leukemia, benign prostatic hypertrophy, microscopic colitis who was previously evaluated in our clinic by Dr. Pankaj Oliveira and was diagnosed with primary progressive multiple sclerosis who returns for yearly follow-up.  His symptoms initially started in  and since then has remained relatively stable.  His initial MRI was in  when it was repeated in .  He had another MRI in 2018 that showed no changes in in the past he had also complained of some weakness in his hand and had a EMG that suggested C7 and C8 nerve root involvement but his MRI of cervical spine was unremarkable.  Patient denies any bladder incontinence, gait instability, focal weakness.  He takes Tylenol with codeine that he feels helps him with his muscle spasms due to multiple sclerosis and is requesting a refill.  There is no history  of excessive fatigue, bladder symptom    He recently tore meniscus in his knee that needed to be repaired.  This was in his right leg that is weak due to his MS but is currently going through rehab and feels his strength has pretty much back to baseline     PROBLEM LIST   Patient Active Problem List   Diagnosis Code     Multiple sclerosis (H) G35     Collagenous colitis K52.831     Chronic pain G89.29     Chronic lymphocytic leukemia (H) C91.10     Benign essential hypertension I10     Benign prostatic hyperplasia N40.0         PAST MEDICAL & SURGICAL HISTORY     Past Medical History:   Patient  has no past medical history on file.    Surgical History:  He  has no past surgical history on file.     SOCIAL HISTORY     Reviewed, and he  reports that he has quit smoking. He has never used smokeless tobacco. He reports current alcohol use.     FAMILY HISTORY     Reviewed, and family history includes Cancer in his mother.     ALLERGIES     Allergies   Allergen Reactions     Bee Venom          REVIEW OF SYSTEMS     A 12 point review of system was performed and was negative except as outlined in the history of present illness.     HOME MEDICATIONS       Current Outpatient Medications:      acetaminophen-codeine (TYLENOL #3) 300-30 MG tablet, Take 1 tablet by mouth every 6 hours as needed for severe pain, Disp: , Rfl:      amLODIPine (NORVASC) 5 MG tablet, Take 5 mg by mouth, Disp: , Rfl:      budesonide (ENTOCORT EC) 3 MG EC capsule, Take 6 mg by mouth every morning, Disp: , Rfl:      tamsulosin (FLOMAX) 0.4 MG capsule, TAKE 1 CAPSULE (0.4 MG TOTAL) BY MOUTH DAILY AFTER SUPPER., Disp: , Rfl:       PHYSICAL EXAM     Vital signs  Ht 1.829 m (6')   Wt 88.5 kg (195 lb)   BMI 26.45 kg/m      Weight:   195 lbs 0 oz    General Physical Exam: Patient is alert and oriented x 3. Vital signs were reviewed and are documented in EMR. Neck was supple, no carotid bruit, thyromegaly, JVD or lymphadenopathy noted.  Neurological  Exam:  Mental status: Alert and oriented x3 no acute distress  Speech: Normal with no dysarthria or aphasia  Cranial nerves: 2 through 12 intact no internuclear ophthalmoplegia  Motor: Moves all 4 extremities  Reflexes: Unable to test  Sensory: Unable to test  Coordination: No dysmetria noted on finger-nose testing  Gait: He has a limping gait     DIAGNOSTIC STUDIES     PERTINENT RADIOLOGY  Following imaging studies were reviewed     MRI HEAD 8/30/17  Comparison now made with brain MRI from 2010. T2 white matter lesions are not significantly changed in size, number and morphology from the previous study. No new abnormal areas of enhancement demonstrated.    CONCLUSION:   1. Cerebral white matter lesions most consistent with history of  multiple sclerosis/demyelinating plaques.  2. No abnormal enhancement to suggest active areas of demyelination.  3. No acute intracranial lesion with no hemorrhage, mass or  cortical-based infarct.    MRI C SPINE 8/30/17  Comparison now made with previous cervical MRI in 2010. Multifocal T2 signal abnormality present within the cervical and upper thoracic cord on both studies. There does appear to be increased T2 signalalteration within the upper cervical cord at the C1-2 level as compared to prior study. Again, no areas of active the enhancement with background of diffuse cord volume loss.    CONCLUSION:   1. Findings consistent with chronic demyelination involving the  cervical cord with no active areas of enhancement at this time.  2. Multilevel severe or moderate severe foraminal stenosis at levels  reported above. Also, multilevel facet arthropathy.  3. Moderate central stenosis with cord volume loss at C4-5 and C5-6.    MRI Head & C Spine done on 8/19/2010  1. Again seen are the multiple small lesions within the white matter of   both cerebral hemispheres, many of which have a periventricular   distribution. Imaging features again compatible with multiple   sclerosis. There are  "several new small areas of demyelination that   have developed in the cerebral white matter since 11/06. None of these   enhance following IV contrast material. Relative sparing of the   brainstem and cerebellum.  2. No new infarct, hemorrhage, hydrocephalus, or intracranial mass   lesion.  3. Minimal generalized cerebral and cerebellar atrophy.  4. Minimal mucosal thickening in the maxillary and ethmoid sinuses.    MRI CERVICAL SPINE:  1. There are multiple small areas of presumed chronic demyelination   within the cervical cord. None of these enhance to suggest active   demyelination.  2. Loss of the normal cervical lordosis centered at the C4-C6 levels   likely positional and degenerative.  3. Mild to moderate degenerative changes in the cervical disks.   Advanced degenerative facet arthropathy at the C3-4 level with slight   anterior subluxation of C3 on C4 due to the facet changes.  4. No high-grade central spinal canal narrowing in the cervical region.  5. At the C3-4 level there is severe narrowing of the right C3-4   foramen due to marked right-sided facet hypertrophy and spurring.  6. At the C6-7 level there is moderate narrowing of the left C6-7   foramen, mild narrowing of the right C6-7 foramen due to bony   spurring.  7. Moderate to severe narrowing of the right C2-3 foramen due to   right-sided facet hypertrophy and uncinate spurring.    PERTINENT LABS  Following labs were reviewed  No results found for any previous visit.       VIDEO VISIT CONSENT  Patient is being evaluated via a billable video visit. The patient has been notified of following:     \"This video visit will be conducted via a call between you and your physician/provider. We have found that certain health care needs can be provided without the need for an in-person physical exam. This service lets us provide the care you need with a video conversation. If a prescription is necessary we can send it directly to your pharmacy. If lab work " "is needed we can place an order for that and you can then stop by our lab to have the test done at a later time. If during the course of the call the physician/provider feels a video visit is not appropriate, you will not be charged for this service.\"    Physician has received verbal consent for a Video Visit from the patient? YES  Patient would like the video invitation sent by: []E-mail  [x]SMS     VIDEO VISIT DETAILS  Type of service: Video Visit  Video Start Time: 12:15 PM  Video End Time (time video stopped): 12:23 PM  Originating Location (Patient Location): Patient's Home  Distant Location (provider location): [facility] [place]   Mode of Communication: Video Conference via [][employer] [x][employer]    Total time spent for face to face visit, reviewing labs/imaging studies, counseling and coordination of care was: 30 Minutes More than 50% of this time was spent on counseling and coordination of care.      This note was dictated using voice recognition software.  Any grammatical or context distortions are unintentional and inherent to the software.             "

## 2020-07-08 NOTE — LETTER
2020         RE: Shivam Jansen  [address] [facility] [address]        Dear Colleague,    Thank you for referring your patient, Shivam Jansen, to the [facility]. Please see a copy of my visit note below.    NEUROLOGY FOLLOW UP VISIT  NOTE       [facility] NEUROLOGY [place]  [address] [place] [address]  Tel: [phone]  Fax: [phone]  [email or web address]     Shivam Jansen,  1950, MRN [id number]  PCP: Rock Hicks, [phone]  Date: 2020     ASSESSMENT & PLAN     Diagnosis code: Multiple sclerosis     Multiple sclerosis  70-year-old male with history of primary progressive multiple sclerosis diagnosed in  who returns for follow-up.  Although he was diagnosed with that condition, his condition has remained relatively stable off any medication.  Most recent imaging studies in 2017 were unchanged.  He takes Tylenol with codeine that helps him with muscle spasm and is requesting refill.  I plan on repeating imaging studies next year follow-up will be in 1 year.    Thank you again for this referral, please feel free to contact me if you have any questions.    Giorgio Weaver MD  [facility]  (Formerly, [facility]     HISTORY OF PRESENT ILLNESS     Patient is 70-year-old male with history of chronic lymphocytic leukemia, benign prostatic hypertrophy, microscopic colitis who was previously evaluated in our clinic by Dr. Pankaj Oliveira and was diagnosed with primary progressive multiple sclerosis who returns for yearly follow-up.  His symptoms initially started in  and since then has remained relatively stable.  His initial MRI was in  when it was repeated in .  He had another MRI in 2018 that showed no changes in in the past he had also complained of some weakness in his hand and had a EMG that suggested C7 and C8 nerve root involvement but his MRI of cervical spine was  unremarkable.  Patient denies any bladder incontinence, gait instability, focal weakness.  He takes Tylenol with codeine that he feels helps him with his muscle spasms due to multiple sclerosis and is requesting a refill.  There is no history of excessive fatigue, bladder symptom    He recently tore meniscus in his knee that needed to be repaired.  This was in his right leg that is weak due to his MS but is currently going through rehab and feels his strength has pretty much back to baseline     PROBLEM LIST   Patient Active Problem List   Diagnosis Code     Multiple sclerosis (H) G35     Collagenous colitis K52.831     Chronic pain G89.29     Chronic lymphocytic leukemia (H) C91.10     Benign essential hypertension I10     Benign prostatic hyperplasia N40.0         PAST MEDICAL & SURGICAL HISTORY     Past Medical History:   Patient  has no past medical history on file.    Surgical History:  He  has no past surgical history on file.     SOCIAL HISTORY     Reviewed, and he  reports that he has quit smoking. He has never used smokeless tobacco. He reports current alcohol use.     FAMILY HISTORY     Reviewed, and family history includes Cancer in his mother.     ALLERGIES     Allergies   Allergen Reactions     Bee Venom          REVIEW OF SYSTEMS     A 12 point review of system was performed and was negative except as outlined in the history of present illness.     HOME MEDICATIONS       Current Outpatient Medications:      acetaminophen-codeine (TYLENOL #3) 300-30 MG tablet, Take 1 tablet by mouth every 6 hours as needed for severe pain, Disp: , Rfl:      amLODIPine (NORVASC) 5 MG tablet, Take 5 mg by mouth, Disp: , Rfl:      budesonide (ENTOCORT EC) 3 MG EC capsule, Take 6 mg by mouth every morning, Disp: , Rfl:      tamsulosin (FLOMAX) 0.4 MG capsule, TAKE 1 CAPSULE (0.4 MG TOTAL) BY MOUTH DAILY AFTER SUPPER., Disp: , Rfl:       PHYSICAL EXAM     Vital signs  Ht 1.829 m (6')   Wt 88.5 kg (195 lb)   BMI 26.45 kg/m       Weight:   195 lbs 0 oz    General Physical Exam: Patient is alert and oriented x 3. Vital signs were reviewed and are documented in EMR. Neck was supple, no carotid bruit, thyromegaly, JVD or lymphadenopathy noted.  Neurological Exam:  Mental status: Alert and oriented x3 no acute distress  Speech: Normal with no dysarthria or aphasia  Cranial nerves: 2 through 12 intact no internuclear ophthalmoplegia  Motor: Moves all 4 extremities  Reflexes: Unable to test  Sensory: Unable to test  Coordination: No dysmetria noted on finger-nose testing  Gait: He has a limping gait     DIAGNOSTIC STUDIES     PERTINENT RADIOLOGY  Following imaging studies were reviewed     MRI HEAD 8/30/17  Comparison now made with brain MRI from 2010. T2 white matter lesions are not significantly changed in size, number and morphology from the previous study. No new abnormal areas of enhancement demonstrated.    CONCLUSION:   1. Cerebral white matter lesions most consistent with history of  multiple sclerosis/demyelinating plaques.  2. No abnormal enhancement to suggest active areas of demyelination.  3. No acute intracranial lesion with no hemorrhage, mass or  cortical-based infarct.    MRI C SPINE 8/30/17  Comparison now made with previous cervical MRI in 2010. Multifocal T2 signal abnormality present within the cervical and upper thoracic cord on both studies. There does appear to be increased T2 signalalteration within the upper cervical cord at the C1-2 level as compared to prior study. Again, no areas of active the enhancement with background of diffuse cord volume loss.    CONCLUSION:   1. Findings consistent with chronic demyelination involving the  cervical cord with no active areas of enhancement at this time.  2. Multilevel severe or moderate severe foraminal stenosis at levels  reported above. Also, multilevel facet arthropathy.  3. Moderate central stenosis with cord volume loss at C4-5 and C5-6.    MRI Head & C Spine done on  "8/19/2010  1. Again seen are the multiple small lesions within the white matter of   both cerebral hemispheres, many of which have a periventricular   distribution. Imaging features again compatible with multiple   sclerosis. There are several new small areas of demyelination that   have developed in the cerebral white matter since 11/06. None of these   enhance following IV contrast material. Relative sparing of the   brainstem and cerebellum.  2. No new infarct, hemorrhage, hydrocephalus, or intracranial mass   lesion.  3. Minimal generalized cerebral and cerebellar atrophy.  4. Minimal mucosal thickening in the maxillary and ethmoid sinuses.    MRI CERVICAL SPINE:  1. There are multiple small areas of presumed chronic demyelination   within the cervical cord. None of these enhance to suggest active   demyelination.  2. Loss of the normal cervical lordosis centered at the C4-C6 levels   likely positional and degenerative.  3. Mild to moderate degenerative changes in the cervical disks.   Advanced degenerative facet arthropathy at the C3-4 level with slight   anterior subluxation of C3 on C4 due to the facet changes.  4. No high-grade central spinal canal narrowing in the cervical region.  5. At the C3-4 level there is severe narrowing of the right C3-4   foramen due to marked right-sided facet hypertrophy and spurring.  6. At the C6-7 level there is moderate narrowing of the left C6-7   foramen, mild narrowing of the right C6-7 foramen due to bony   spurring.  7. Moderate to severe narrowing of the right C2-3 foramen due to   right-sided facet hypertrophy and uncinate spurring.    PERTINENT LABS  Following labs were reviewed  No results found for any previous visit.       VIDEO VISIT CONSENT  Patient is being evaluated via a billable video visit. The patient has been notified of following:     \"This video visit will be conducted via a call between you and your physician/provider. We have found that certain health " "care needs can be provided without the need for an in-person physical exam. This service lets us provide the care you need with a video conversation. If a prescription is necessary we can send it directly to your pharmacy. If lab work is needed we can place an order for that and you can then stop by our lab to have the test done at a later time. If during the course of the call the physician/provider feels a video visit is not appropriate, you will not be charged for this service.\"    Physician has received verbal consent for a Video Visit from the patient? YES  Patient would like the video invitation sent by: []E-mail  [x][employer]     VIDEO VISIT DETAILS  Type of service: Video Visit  Video Start Time: 12:15 PM  Video End Time (time video stopped): 12:23 PM  Originating Location (Patient Location): Patient's Home  Distant Location (provider location): [facility] [place]   Mode of Communication: Video Conference via [][employer] [x][employer]    Total time spent for face to face visit, reviewing labs/imaging studies, counseling and coordination of care was: 30 Minutes More than 50% of this time was spent on counseling and coordination of care.      This note was dictated using voice recognition software.  Any grammatical or context distortions are unintentional and inherent to the software.               Again, thank you for allowing me to participate in the care of your patient.        Sincerely,        Giorgio Weaver MD    "

## 2020-07-23 ENCOUNTER — APPOINTMENT (OUTPATIENT)
Age: 70
Setting detail: DERMATOLOGY
End: 2020-07-23

## 2020-07-23 DIAGNOSIS — D18.0 HEMANGIOMA: ICD-10-CM

## 2020-07-23 DIAGNOSIS — L82.1 OTHER SEBORRHEIC KERATOSIS: ICD-10-CM

## 2020-07-23 DIAGNOSIS — L82.0 INFLAMED SEBORRHEIC KERATOSIS: ICD-10-CM

## 2020-07-23 DIAGNOSIS — Q82.5 CONGENITAL NON-NEOPLASTIC NEVUS: ICD-10-CM

## 2020-07-23 DIAGNOSIS — L57.0 ACTINIC KERATOSIS: ICD-10-CM

## 2020-07-23 DIAGNOSIS — L81.4 OTHER MELANIN HYPERPIGMENTATION: ICD-10-CM

## 2020-07-23 DIAGNOSIS — Z71.89 OTHER SPECIFIED COUNSELING: ICD-10-CM

## 2020-07-23 DIAGNOSIS — D22 MELANOCYTIC NEVI: ICD-10-CM

## 2020-07-23 DIAGNOSIS — D69.2 OTHER NONTHROMBOCYTOPENIC PURPURA: ICD-10-CM

## 2020-07-23 PROBLEM — D22.9 MELANOCYTIC NEVI, UNSPECIFIED: Status: ACTIVE | Noted: 2020-07-23

## 2020-07-23 PROBLEM — D18.01 HEMANGIOMA OF SKIN AND SUBCUTANEOUS TISSUE: Status: ACTIVE | Noted: 2020-07-23

## 2020-07-23 PROBLEM — D23.72 OTHER BENIGN NEOPLASM OF SKIN OF LEFT LOWER LIMB, INCLUDING HIP: Status: ACTIVE | Noted: 2020-07-23

## 2020-07-23 PROCEDURE — 17003 DESTRUCT PREMALG LES 2-14: CPT | Mod: 59

## 2020-07-23 PROCEDURE — OTHER SUNSCREEN RECOMMENDATIONS: OTHER

## 2020-07-23 PROCEDURE — OTHER LIQUID NITROGEN: OTHER

## 2020-07-23 PROCEDURE — 99203 OFFICE O/P NEW LOW 30 MIN: CPT | Mod: 25

## 2020-07-23 PROCEDURE — 17110 DESTRUCT B9 LESION 1-14: CPT

## 2020-07-23 PROCEDURE — OTHER COUNSELING: OTHER

## 2020-07-23 PROCEDURE — 17000 DESTRUCT PREMALG LESION: CPT | Mod: 59

## 2020-07-23 ASSESSMENT — LOCATION SIMPLE DESCRIPTION DERM
LOCATION SIMPLE: LEFT SCALP
LOCATION SIMPLE: POSTERIOR NECK
LOCATION SIMPLE: RIGHT FOREARM
LOCATION SIMPLE: ABDOMEN
LOCATION SIMPLE: LEFT FOREHEAD
LOCATION SIMPLE: FRONTAL SCALP

## 2020-07-23 ASSESSMENT — LOCATION DETAILED DESCRIPTION DERM
LOCATION DETAILED: LEFT CENTRAL FRONTAL SCALP
LOCATION DETAILED: LEFT LATERAL ABDOMEN
LOCATION DETAILED: MID POSTERIOR NECK
LOCATION DETAILED: LEFT SUPERIOR LATERAL FOREHEAD
LOCATION DETAILED: MEDIAL FRONTAL SCALP
LOCATION DETAILED: RIGHT DISTAL DORSAL FOREARM

## 2020-07-23 ASSESSMENT — LOCATION ZONE DERM
LOCATION ZONE: NECK
LOCATION ZONE: SCALP
LOCATION ZONE: FACE
LOCATION ZONE: ARM
LOCATION ZONE: TRUNK

## 2020-07-23 NOTE — PROCEDURE: LIQUID NITROGEN
Detail Level: Simple
Medical Necessity Information: It is in your best interest to select a reason for this procedure from the list below. All of these items fulfill various CMS LCD requirements except the new and changing color options.
Include Z78.9 (Other Specified Conditions Influencing Health Status) As An Associated Diagnosis?: No
Number Of Freeze-Thaw Cycles: 2 freeze-thaw cycles
Medical Necessity Clause: This procedure was medically necessary because the lesions that were treated were:
Consent: The patient's consent was obtained including but not limited to risks of crusting, scabbing, blistering, scarring, darker or lighter pigmentary change, recurrence, incomplete removal and infection.
Detail Level: Detailed
Duration Of Freeze Thaw-Cycle (Seconds): 5-10
Post-Care Instructions: I reviewed with the patient in detail post-care instructions. Patient is to wear sunprotection, and avoid picking at any of the treated lesions. Pt may apply Vaseline to crusted or scabbing areas.
Duration Of Freeze Thaw-Cycle (Seconds): 3
Number Of Freeze-Thaw Cycles: 3 freeze-thaw cycles

## 2020-07-23 NOTE — HPI: EVALUATION OF SKIN LESION(S)
How Severe Are Your Spot(S)?: mild
Hpi Title: Evaluation of Skin Lesions
Additional History: Fbe\\nCLL and MS

## 2020-07-27 ENCOUNTER — OFFICE VISIT - HEALTHEAST (OUTPATIENT)
Dept: ONCOLOGY | Facility: CLINIC | Age: 70
End: 2020-07-27

## 2020-07-27 ENCOUNTER — AMBULATORY - HEALTHEAST (OUTPATIENT)
Dept: INFUSION THERAPY | Facility: CLINIC | Age: 70
End: 2020-07-27

## 2020-07-27 DIAGNOSIS — C91.10 CHRONIC LYMPHOCYTIC LEUKEMIA (H): ICD-10-CM

## 2020-07-27 DIAGNOSIS — C91.10 CLL (CHRONIC LYMPHOCYTIC LEUKEMIA) (H): ICD-10-CM

## 2020-07-27 LAB
ALBUMIN SERPL-MCNC: 3.7 G/DL (ref 3.5–5)
ALP SERPL-CCNC: 90 U/L (ref 45–120)
ALT SERPL W P-5'-P-CCNC: 13 U/L (ref 0–45)
ANION GAP SERPL CALCULATED.3IONS-SCNC: 6 MMOL/L (ref 5–18)
AST SERPL W P-5'-P-CCNC: 12 U/L (ref 0–40)
BASOPHILS # BLD AUTO: 0 THOU/UL (ref 0–0.2)
BASOPHILS NFR BLD AUTO: 0 % (ref 0–2)
BILIRUB SERPL-MCNC: 0.3 MG/DL (ref 0–1)
BUN SERPL-MCNC: 11 MG/DL (ref 8–22)
CALCIUM SERPL-MCNC: 9.2 MG/DL (ref 8.5–10.5)
CHLORIDE BLD-SCNC: 100 MMOL/L (ref 98–107)
CO2 SERPL-SCNC: 27 MMOL/L (ref 22–31)
CREAT SERPL-MCNC: 0.87 MG/DL (ref 0.7–1.3)
EOSINOPHIL COUNT (ABSOLUTE): 0 THOU/UL (ref 0–0.4)
EOSINOPHIL NFR BLD AUTO: 0 % (ref 0–6)
ERYTHROCYTE [DISTWIDTH] IN BLOOD BY AUTOMATED COUNT: 14.6 % (ref 11–14.5)
GFR SERPL CREATININE-BSD FRML MDRD: >60 ML/MIN/1.73M2
GLUCOSE BLD-MCNC: 102 MG/DL (ref 70–125)
HCT VFR BLD AUTO: 36.1 % (ref 40–54)
HGB BLD-MCNC: 11.5 G/DL (ref 14–18)
LYMPHOCYTES # BLD AUTO: 37.4 THOU/UL (ref 0.8–4.4)
LYMPHOCYTES NFR BLD AUTO: 86 % (ref 20–40)
MCH RBC QN AUTO: 29.3 PG (ref 27–34)
MCHC RBC AUTO-ENTMCNC: 31.9 G/DL (ref 32–36)
MCV RBC AUTO: 92 FL (ref 80–100)
MONOCYTES # BLD AUTO: 0.9 THOU/UL (ref 0–0.9)
MONOCYTES NFR BLD AUTO: 2 % (ref 2–10)
PLAT MORPH BLD: NORMAL
PLATELET # BLD AUTO: 210 THOU/UL (ref 140–440)
PMV BLD AUTO: 9.1 FL (ref 8.5–12.5)
POTASSIUM BLD-SCNC: 4.4 MMOL/L (ref 3.5–5)
PROT SERPL-MCNC: 6.3 G/DL (ref 6–8)
RBC # BLD AUTO: 3.93 MILL/UL (ref 4.4–6.2)
SODIUM SERPL-SCNC: 133 MMOL/L (ref 136–145)
TOTAL NEUTROPHILS-ABS(DIFF): 5.2 THOU/UL (ref 2–7.7)
TOTAL NEUTROPHILS-REL(DIFF): 12 % (ref 50–70)
WBC: 43.5 THOU/UL (ref 4–11)

## 2020-07-30 ENCOUNTER — COMMUNICATION - HEALTHEAST (OUTPATIENT)
Dept: FAMILY MEDICINE | Facility: CLINIC | Age: 70
End: 2020-07-30

## 2020-07-30 DIAGNOSIS — B02.9 HERPES ZOSTER WITHOUT COMPLICATION: ICD-10-CM

## 2020-08-04 ENCOUNTER — COMMUNICATION - HEALTHEAST (OUTPATIENT)
Dept: ONCOLOGY | Facility: CLINIC | Age: 70
End: 2020-08-04

## 2020-08-05 ENCOUNTER — RECORDS - HEALTHEAST (OUTPATIENT)
Dept: ADMINISTRATIVE | Facility: OTHER | Age: 70
End: 2020-08-05

## 2020-08-12 ENCOUNTER — COMMUNICATION - HEALTHEAST (OUTPATIENT)
Dept: FAMILY MEDICINE | Facility: CLINIC | Age: 70
End: 2020-08-12

## 2020-11-30 ENCOUNTER — COMMUNICATION - HEALTHEAST (OUTPATIENT)
Dept: SCHEDULING | Facility: CLINIC | Age: 70
End: 2020-11-30

## 2020-11-30 ENCOUNTER — OFFICE VISIT - HEALTHEAST (OUTPATIENT)
Dept: FAMILY MEDICINE | Facility: CLINIC | Age: 70
End: 2020-11-30

## 2020-11-30 DIAGNOSIS — R35.1 NOCTURIA: ICD-10-CM

## 2020-11-30 DIAGNOSIS — R35.1 BENIGN PROSTATIC HYPERPLASIA WITH NOCTURIA: ICD-10-CM

## 2020-11-30 DIAGNOSIS — I10 BENIGN ESSENTIAL HYPERTENSION: ICD-10-CM

## 2020-11-30 DIAGNOSIS — Z12.5 ENCOUNTER FOR SCREENING FOR MALIGNANT NEOPLASM OF PROSTATE: ICD-10-CM

## 2020-11-30 DIAGNOSIS — N40.1 BENIGN PROSTATIC HYPERPLASIA WITH NOCTURIA: ICD-10-CM

## 2020-11-30 LAB
ANION GAP SERPL CALCULATED.3IONS-SCNC: 9 MMOL/L (ref 5–18)
BUN SERPL-MCNC: 9 MG/DL (ref 8–22)
CALCIUM SERPL-MCNC: 9.6 MG/DL (ref 8.5–10.5)
CHLORIDE BLD-SCNC: 94 MMOL/L (ref 98–107)
CO2 SERPL-SCNC: 27 MMOL/L (ref 22–31)
CREAT SERPL-MCNC: 0.82 MG/DL (ref 0.7–1.3)
ERYTHROCYTE [DISTWIDTH] IN BLOOD BY AUTOMATED COUNT: 13.8 % (ref 11–14.5)
GFR SERPL CREATININE-BSD FRML MDRD: >60 ML/MIN/1.73M2
GLUCOSE BLD-MCNC: 100 MG/DL (ref 70–125)
HCT VFR BLD AUTO: 35 % (ref 40–54)
HGB BLD-MCNC: 11.4 G/DL (ref 14–18)
MCH RBC QN AUTO: 29.3 PG (ref 27–34)
MCHC RBC AUTO-ENTMCNC: 32.6 G/DL (ref 32–36)
MCV RBC AUTO: 90 FL (ref 80–100)
PLATELET # BLD AUTO: 192 THOU/UL (ref 140–440)
PMV BLD AUTO: 9.6 FL (ref 8.5–12.5)
POTASSIUM BLD-SCNC: 4.3 MMOL/L (ref 3.5–5)
PSA SERPL-MCNC: 0.6 NG/ML (ref 0–4.5)
RBC # BLD AUTO: 3.89 MILL/UL (ref 4.4–6.2)
SODIUM SERPL-SCNC: 130 MMOL/L (ref 136–145)
WBC: 49.7 THOU/UL (ref 4–11)

## 2020-12-28 ENCOUNTER — OFFICE VISIT - HEALTHEAST (OUTPATIENT)
Dept: ONCOLOGY | Facility: CLINIC | Age: 70
End: 2020-12-28

## 2020-12-28 ENCOUNTER — AMBULATORY - HEALTHEAST (OUTPATIENT)
Dept: INFUSION THERAPY | Facility: CLINIC | Age: 70
End: 2020-12-28

## 2020-12-28 DIAGNOSIS — C91.10 CHRONIC LYMPHOCYTIC LEUKEMIA (H): ICD-10-CM

## 2020-12-28 LAB
ALBUMIN SERPL-MCNC: 4 G/DL (ref 3.5–5)
ALP SERPL-CCNC: 103 U/L (ref 45–120)
ALT SERPL W P-5'-P-CCNC: 18 U/L (ref 0–45)
ANION GAP SERPL CALCULATED.3IONS-SCNC: 9 MMOL/L (ref 5–18)
AST SERPL W P-5'-P-CCNC: 18 U/L (ref 0–40)
BASOPHILS # BLD AUTO: 0 THOU/UL (ref 0–0.2)
BASOPHILS NFR BLD AUTO: 0 % (ref 0–2)
BILIRUB SERPL-MCNC: 0.4 MG/DL (ref 0–1)
BUN SERPL-MCNC: 11 MG/DL (ref 8–22)
CALCIUM SERPL-MCNC: 9.2 MG/DL (ref 8.5–10.5)
CHLORIDE BLD-SCNC: 95 MMOL/L (ref 98–107)
CO2 SERPL-SCNC: 26 MMOL/L (ref 22–31)
CREAT SERPL-MCNC: 0.78 MG/DL (ref 0.7–1.3)
EOSINOPHIL COUNT (ABSOLUTE): 0 THOU/UL (ref 0–0.4)
EOSINOPHIL NFR BLD AUTO: 0 % (ref 0–6)
ERYTHROCYTE [DISTWIDTH] IN BLOOD BY AUTOMATED COUNT: 13.7 % (ref 11–14.5)
GFR SERPL CREATININE-BSD FRML MDRD: >60 ML/MIN/1.73M2
GLUCOSE BLD-MCNC: 94 MG/DL (ref 70–125)
HCT VFR BLD AUTO: 35.6 % (ref 40–54)
HGB BLD-MCNC: 11.4 G/DL (ref 14–18)
IMMATURE GRANULOCYTE % - MAN (DIFF): 0 %
IMMATURE GRANULOCYTE ABSOLUTE - MAN (DIFF): 0 THOU/UL
LYMPHOCYTES # BLD AUTO: 43.7 THOU/UL (ref 0.8–4.4)
LYMPHOCYTES NFR BLD AUTO: 89 % (ref 20–40)
MCH RBC QN AUTO: 28.4 PG (ref 27–34)
MCHC RBC AUTO-ENTMCNC: 32 G/DL (ref 32–36)
MCV RBC AUTO: 89 FL (ref 80–100)
MONOCYTES # BLD AUTO: 1 THOU/UL (ref 0–0.9)
MONOCYTES NFR BLD AUTO: 2 % (ref 2–10)
PLAT MORPH BLD: NORMAL
PLATELET # BLD AUTO: 219 THOU/UL (ref 140–440)
PMV BLD AUTO: 8.9 FL (ref 8.5–12.5)
POTASSIUM BLD-SCNC: 4.4 MMOL/L (ref 3.5–5)
PROT SERPL-MCNC: 6.8 G/DL (ref 6–8)
RBC # BLD AUTO: 4.02 MILL/UL (ref 4.4–6.2)
SODIUM SERPL-SCNC: 130 MMOL/L (ref 136–145)
TOTAL NEUTROPHILS-ABS(DIFF): 4.4 THOU/UL (ref 2–7.7)
TOTAL NEUTROPHILS-REL(DIFF): 9 % (ref 50–70)
WBC: 49.1 THOU/UL (ref 4–11)

## 2021-01-18 ENCOUNTER — RECORDS - HEALTHEAST (OUTPATIENT)
Dept: ADMINISTRATIVE | Facility: OTHER | Age: 71
End: 2021-01-18

## 2021-02-03 DIAGNOSIS — G35 MULTIPLE SCLEROSIS (H): ICD-10-CM

## 2021-02-03 NOTE — TELEPHONE ENCOUNTER
Refill request for tylenol #3  Medication T'd for review and signature  Pearl Azul CMA on 2/3/2021 at 8:53 AM

## 2021-03-02 ENCOUNTER — RECORDS - HEALTHEAST (OUTPATIENT)
Dept: ADMINISTRATIVE | Facility: OTHER | Age: 71
End: 2021-03-02

## 2021-03-09 ENCOUNTER — RECORDS - HEALTHEAST (OUTPATIENT)
Dept: ADMINISTRATIVE | Facility: OTHER | Age: 71
End: 2021-03-09

## 2021-05-26 ENCOUNTER — RECORDS - HEALTHEAST (OUTPATIENT)
Dept: ADMINISTRATIVE | Facility: CLINIC | Age: 71
End: 2021-05-26

## 2021-05-26 ENCOUNTER — OFFICE VISIT - HEALTHEAST (OUTPATIENT)
Dept: FAMILY MEDICINE | Facility: CLINIC | Age: 71
End: 2021-05-26

## 2021-05-26 DIAGNOSIS — Z00.00 WELLNESS EXAMINATION: ICD-10-CM

## 2021-05-26 DIAGNOSIS — I10 BENIGN ESSENTIAL HYPERTENSION: ICD-10-CM

## 2021-05-26 LAB
ANION GAP SERPL CALCULATED.3IONS-SCNC: 8 MMOL/L (ref 5–18)
BUN SERPL-MCNC: 9 MG/DL (ref 8–28)
CALCIUM SERPL-MCNC: 9.7 MG/DL (ref 8.5–10.5)
CHLORIDE BLD-SCNC: 98 MMOL/L (ref 98–107)
CHOLEST SERPL-MCNC: 185 MG/DL
CO2 SERPL-SCNC: 27 MMOL/L (ref 22–31)
CREAT SERPL-MCNC: 0.84 MG/DL (ref 0.7–1.3)
ERYTHROCYTE [DISTWIDTH] IN BLOOD BY AUTOMATED COUNT: 15.1 % (ref 11–14.5)
FASTING STATUS PATIENT QL REPORTED: YES
GFR SERPL CREATININE-BSD FRML MDRD: >60 ML/MIN/1.73M2
GLUCOSE BLD-MCNC: 93 MG/DL (ref 70–125)
HBA1C MFR BLD: 5.3 %
HCT VFR BLD AUTO: 34.5 % (ref 40–54)
HDLC SERPL-MCNC: 88 MG/DL
HGB BLD-MCNC: 11.1 G/DL (ref 14–18)
LDLC SERPL CALC-MCNC: 89 MG/DL
MCH RBC QN AUTO: 29 PG (ref 27–34)
MCHC RBC AUTO-ENTMCNC: 32.2 G/DL (ref 32–36)
MCV RBC AUTO: 90 FL (ref 80–100)
PLATELET # BLD AUTO: 195 THOU/UL (ref 140–440)
PMV BLD AUTO: 9.9 FL (ref 8.5–12.5)
POTASSIUM BLD-SCNC: 4.3 MMOL/L (ref 3.5–5)
RBC # BLD AUTO: 3.83 MILL/UL (ref 4.4–6.2)
SODIUM SERPL-SCNC: 133 MMOL/L (ref 136–145)
TRIGL SERPL-MCNC: 40 MG/DL
WBC: 48.3 THOU/UL (ref 4–11)

## 2021-05-28 NOTE — TELEPHONE ENCOUNTER
Patient Returning Call  Reason for call:  Return call  Information relayed to patient:  Patient was informed of Rock Hicks MD's message below in regards to his recent lab results.  Patient has additional questions:  No  If YES, what are your questions/concerns:  n/a  Okay to leave a detailed message?: No call back needed

## 2021-05-28 NOTE — TELEPHONE ENCOUNTER
----- Message from Rock Hicks MD sent at 5/16/2019  7:56 AM CDT -----  Borderline low serum sodium at 135 same as before.  Borderline elevated serum calcium at 10.6.  Continue current medication regimen and follow-up to see me in 6 months and plan recheck on these at that time.

## 2021-05-28 NOTE — PROGRESS NOTES
Assessment:  1.  Hypertension, controlled.  2.  Nocturia and benign prostatic hypertrophy.  3.  Underlying chronic lymphocytic leukemia.  4.  Underlying multiple sclerosis.  5.  Neck skin tag versus wart.    Plan: Check basic profile.  Continue current amlodipine dose.  Recommend discontinue Hopper and 5 mg nightly, recommend start tamsulosin 0.4 mg-1 p.o. every afternoon #90 refillable x1.  If that is not helping certainly then let me know.  Follow-up at minimum in 6 months.  He can also see Dr. Riggs if the urination is not improving.  Follow through with heme-onc regarding the chronic lymphocytic leukemia as well as his neurologist regarding the MS.  He can return for excision of the above skin tag versus wart if he desires but certainly explained it did look benign.    Subjective: 69-year-old male presenting for follow-up on multiple issues.  Regarding hypertension no headaches dizziness or leg swelling.  He does get up multiple times at night to urinate.  He notes that after he goes to sleep he is up within an hour and a half, will urinate, then often up within 1/2-hour after that and his wife acknowledges yes that he is up multiple times at night to urinate.  No pain with urination.  He had tried stopping his Hopper in which Dr. Riggs's that he could try doing but his urination was even worse then.  Patient Active Problem List   Diagnosis     Urinary Frequency More Than Twice At Night (Nocturia)     Multiple Sclerosis     Chronic Lymphocytic Leukemia     Osteoarthritis of left knee     Collagenous colitis     Benign essential hypertension     History reviewed. No pertinent past medical history.  Allergies   Allergen Reactions     Venom-Honey Bee      Current medications include amlodipine 5 mg daily, vitamin D and magnesium and fatty acid, terazosin 5 mg nightly, and Tylenol with codeine as needed.  All other review of systems are negative.  He does drink several cups of coffee in the morning but never  "in the later day.    Objective:/70   Pulse 65   Ht 5' 11\" (1.803 m)   Wt 201 lb (91.2 kg)   SpO2 98%   BMI 28.03 kg/m    HEENT examination is negative.  Neck supple.  Lungs clear.  Heart regular rate and rhythm without murmur.  Abdomen shows no masses tenderness or paraspinal megaly.  No pedal edema.  He does have a slight right leg weakness as chronically.  He has a skin tag or small wart on the base of the neck on the left side not far from the midline.  "

## 2021-05-30 NOTE — PROGRESS NOTES
Assessment:  1.  Excision by shave method of left neck skin lesion.  #2.  Benign prostatic hypertrophy.    Plan: We will check path.  Can remove Band-Aid tomorrow.  Be careful with shaving around it.  Follow-up if any difficulties.  He will call if he is not heard on the path report within the next week.  I explained I thought it was probably a warty growth and will probably be benign.  I do recommend he continue on the tamsulosin 0.4 mg dose each evening and go ahead and taper off his caffeine entirely for the next 2 weeks to see if that makes any difference for his nighttime urination or not.  He sees his neurologist next week.    Subjective: 69-year-old male presenting for excision of the left side of the neck skin lesion.  He understands the risk of bleeding infection etc. and he desires to proceed because it irritates him.  Patient Active Problem List   Diagnosis     Urinary Frequency More Than Twice At Night (Nocturia)     Multiple Sclerosis     Chronic Lymphocytic Leukemia     Osteoarthritis of left knee     Collagenous colitis     Benign essential hypertension     No past medical history on file.  Allergies   Allergen Reactions     Venom-Honey Bee      Current Outpatient Medications   Medication Sig Dispense Refill     acetaminophen-codeine (TYLENOL #3) 300-30 mg per tablet Take 1 tablet by mouth every 6 (six) hours as needed.              amLODIPine (NORVASC) 5 MG tablet Take 1 tablet (5 mg total) by mouth daily. 90 tablet 3     budesonide (ENTOCORT EC) 3 mg 24 hr capsule 1 CAP DAILY  3     calcium carbonate-vitamin D3 500 mg(1,250mg) -400 unit Tab Take 2 tablets by mouth daily.              CHOLECALCIFEROL, VITAMIN D3, (VITAMIN D3 ORAL) Take 2,000 Units by mouth daily.       cyanocobalamin (VITAMIN B-12) 250 MCG tablet Take 250 mcg by mouth daily.       magnesium 250 mg Tab Take 2 tablets by mouth daily.       OMEGA-3 FATTY ACIDS (FISH OIL CONCENTRATE ORAL) Take 1,250 mg by mouth daily.       tamsulosin  "(FLOMAX) 0.4 mg cap Take 1 capsule (0.4 mg total) by mouth daily after supper. 90 capsule 1     No current facility-administered medications for this visit.      He notes that his urination is better on the tamsulosin but is not as good as he wishes it would be.  He did cut down his caffeine to 2 cups of coffee in the morning but has not tried stopping it completely.    Objective:/66   Pulse 68   Temp (!) 96.3  F (35.7  C) (Oral)   Ht 6' 1.5\" (1.867 m)   Wt 198 lb (89.8 kg)   SpO2 97%   BMI 25.77 kg/m    Patient is placed in the supine position.  The skin lesion is on the left side of the neck in the middle area.  The area is numbed with 1% Xylocaine with epinephrine, about 0.5 cc.  It is then prepped with Betadine.  Then using a #15 blade I did a shave excision of the lesion and sent the lesion for surgical pathology.  I cauterized the base with the battery-operated cautery, then dressed the area with bacitracin and Band-Aid.  Patient tolerated the procedure well.  Estimated blood loss- less than 1 mL.  "

## 2021-05-30 NOTE — PROGRESS NOTES
[facility] Hematology and Oncology Progress Note    Patient: Shivam Jansen  MRN: [id number]  Date of Service: July 29, 2019      Assessment and Plan:    1.  CLL: His blood counts remained generally stable.  No trending upward of his lymphocyte count.  Hemoglobin and platelets are stable.  No indication for any treatment at this time.  We will continue to follow him every 6 months.  He will let us know in the interim if he develops any new symptoms.    2.  Immunizations:  Pneumo Conj 13-V on  7/11/2018.  Pneumo Polysac 23-V on 12/14/2015     3.  Right hand bruising: He has muscle atrophy on the dorsum of the right hand.  This results in thin skin.  He is having some bruising with trauma.  Most likely from capillary injury.    ECOG Performance   ECOG Performance Status: 0    Distress Assessment  Distress Assessment Score: No distress    Pain  Currently in Pain: No/denies    Diagnosis:    Chronic lymphocytic leukemia. CHEEK stage 0. Diagnosed 5/2010. Cytogenetic shows a 13q (better prognosis).    Treatment:    Observation.    Interim History:    Shayan returns today for a follow-up visit.  Was last seen about 6 months ago.  Continues to do well.  No significant changes in his health since then.  No complaints today.  Denies fevers, chills, night sweats.  He has been having some bruising in the right hand on the dorsum of the right hand.    Review of Systems:    Constitutional  Constitutional (WDL): All constitutional elements are within defined limits  Neurosensory  Neurosensory (WDL): Exceptions to WDL  Peripheral Motor Neuropathy: None  Ataxia: Asymptomatic, clinical or diagnostic observations only, intervention not indicated(uses cane)  Peripheral Sensory Neuropathy: None  Confusion: None  Cardiovascular  Cardiovascular (WDL): All cardiovascular elements are within defined limits  Pulmonary  Respiratory (WDL): Within Defined Limits  Gastrointestinal  Gastrointestinal (WDL): All gastrointestinal elements are within  defined limits  Genitourinary  Genitourinary (WDL): All genitourinary elements are within defined limits  Integumentary  Integumentary (WDL): Exceptions to WDL(bruises easily, bruise R hand)  Alopecia: None  Rash Maculo-Papular: None  Pruritus: None  Urticaria: None  Palmar-Plantar Erythrodysesthesia Syndrome: None  Flushing: None  Patient Coping  Patient Coping: Accepting;Open/discussion  Accompanied by  Accompanied by: Family Member    Past History:    No past medical history on file.     Physical Exam:    Recent Vitals 7/29/2019   Height -   Weight 202 lbs 3 oz   BSA (m2) 2.18 m2   /67   Pulse 67   Temp 98   Temp src 1   SpO2 97   Some recent data might be hidden     General: patient appears stated age of 69 y.o.. Nontoxic and in no distress.   HEENT: Head: atraumatic, normocephalic. Sclerae anicteric.  Chest:  Normal respiratory effort.   Cardiac:  No edema.  Abdomen: abdomen is soft, non-distended.   Extremities: normal tone and muscle bulk.  Skin: no lesions or rash. Warm and dry.   CNS: alert and oriented. Grossly non-focal.   Psychiatric: normal mood and affect.     Lab Results:    Recent Results (from the past 24 hour(s))   Comprehensive Metabolic Panel    Collection Time: 07/29/19  1:35 PM   Result Value Ref Range    Sodium 137 136 - 145 mmol/L    Potassium 4.8 3.5 - 5.0 mmol/L    Chloride 101 98 - 107 mmol/L    CO2 27 22 - 31 mmol/L    Anion Gap, Calculation 9 5 - 18 mmol/L    Glucose 94 70 - 125 mg/dL    BUN 13 8 - 22 mg/dL    Creatinine 0.82 0.70 - 1.30 mg/dL    GFR MDRD Af Amer >60 >60 mL/min/1.73m2    GFR MDRD Non Af Amer >60 >60 mL/min/1.73m2    Bilirubin, Total 0.5 0.0 - 1.0 mg/dL    Calcium 10.2 8.5 - 10.5 mg/dL    Protein, Total 6.4 6.0 - 8.0 g/dL    Albumin 3.9 3.5 - 5.0 g/dL    Alkaline Phosphatase 94 45 - 120 U/L    AST 13 0 - 40 U/L    ALT 14 0 - 45 U/L   HM1 (CBC with Diff)    Collection Time: 07/29/19  1:35 PM   Result Value Ref Range    WBC 45.2 (HH) 4.0 - 11.0 thou/uL    RBC 4.09 (L)  4.40 - 6.20 mill/uL    Hemoglobin 11.6 (L) 14.0 - 18.0 g/dL    Hematocrit 37.9 (L) 40.0 - 54.0 %    MCV 93 80 - 100 fL    MCH 28.4 27.0 - 34.0 pg    MCHC 30.6 (L) 32.0 - 36.0 g/dL    RDW 14.4 11.0 - 14.5 %    Platelets 191 140 - 440 thou/uL    MPV 8.9 8.5 - 12.5 fL   Manual Differential    Collection Time: 07/29/19  1:35 PM   Result Value Ref Range    Total Neutrophils % 14 (L) 50 - 70 %    Lymphocytes % 80 (H) 20 - 40 %    Monocytes % 6 2 - 10 %    Eosinophils %  0 0 - 6 %    Basophils % 0 0 - 2 %    Total Neutrophils Absolute 6.3 2.0 - 7.7 thou/ul    Lymphocytes Absolute 36.2 (H) 0.8 - 4.4 thou/uL    Monocytes Absolute 2.7 (H) 0.0 - 0.9 thou/uL    Eosinophils Absolute 0.0 0.0 - 0.4 thou/uL    Basophils Absolute 0.0 0.0 - 0.2 thou/uL    Platelet Estimate Normal Normal     Imaging:    No results found.      Signed by: Marco Bautista MD

## 2021-05-31 VITALS — BODY MASS INDEX: 27.2 KG/M2 | WEIGHT: 200.8 LBS | HEIGHT: 72 IN

## 2021-05-31 VITALS — BODY MASS INDEX: 27.98 KG/M2 | WEIGHT: 206.3 LBS

## 2021-05-31 NOTE — TELEPHONE ENCOUNTER
Patient calling. He is reporting that Dr. Hicks  Increased his flomax from ! Daily to 2 daily.  He is reporting that he really does not think it is helping very much at all. He still has to get up to void 4-5 times a night.  Overall it has improved a little bit. He also reports , that he has more nasal congestion   Taking 2  Flomax daily.  More nasal congestion for the past 4 days.    His questions are as follows.    Can he go back to only taking 1 flomax daily,?  And can he cancel his Sept 4th  Follow up appointment, and just keep the November 13th appointment for follow up ?    Please advise, and return call to patient with POC.    Miriam Akbar RN  [facility] Triage/refill nurse

## 2021-05-31 NOTE — TELEPHONE ENCOUNTER
Rx sent for increased dose of 0.8 mg q pm. But recommend he schedule visit in next several weeks to recheck issue. If he is drinking any caffeine at all or any alcohol at all request that he stop both while he increases dose of tamsulosin and then at recheck can decide course from there.

## 2021-05-31 NOTE — TELEPHONE ENCOUNTER
Reason for Disposition    Has to get out of bed to urinate > 2 times a night (i.e., nocturia)    Protocols used: URINARY SYMPTOMS-A-AH

## 2021-05-31 NOTE — TELEPHONE ENCOUNTER
"Medication Question or Clarification  Who is calling: Patient  What medication are you calling about? (include dose and sig)     tamsulosin (FLOMAX) 0.4 mg cap 90 capsule 1 5/15/2019     Sig - Route: Take 1 capsule (0.4 mg total) by mouth daily after supper. - Oral    Sent to pharmacy as: tamsulosin (FLOMAX) 0.4 mg cap    E-Prescribing Status: Receipt confirmed by pharmacy (5/15/2019 11:47 AM CDT)        Who prescribed the medication?: Rock Hicks MD  What is your question/concern?: The patient would like to know if the dose on the above script should be increased. The patient complains of still experiencing \"constant\" urination at night. The patient states, \"It had helped the first few weeks and now I am back to where I was before he started the medication.\" The patient is up with nocturia 5-7 a night 7 days a week, though reports that some nights are better than other nights. The patient stops drinking fluids around 6:30 at night with exception to swallow a pill at night. The patient states he will need a refill very soon and if needs an increase the patient states this could be done now.  Pharmacy: [facility] [id number] [place] [address]   Okay to leave a detailed message?: Yes  Site CMT - Please call the pharmacy to obtain any additional needed information.  "

## 2021-05-31 NOTE — TELEPHONE ENCOUNTER
It is okay for him to reduce tamsulosin to 1 pill i.e. 0.4 mg p.o. every afternoon.  If his urination is stable he can follow-up to see me in November.  Call return earlier if any difficulties.  Do continue to minimize both caffeine and alcohol.

## 2021-06-01 ENCOUNTER — COMMUNICATION - HEALTHEAST (OUTPATIENT)
Dept: FAMILY MEDICINE | Facility: CLINIC | Age: 71
End: 2021-06-01

## 2021-06-01 VITALS — WEIGHT: 200.19 LBS | BODY MASS INDEX: 27.11 KG/M2 | HEIGHT: 72 IN

## 2021-06-01 VITALS — WEIGHT: 200 LBS | HEIGHT: 73 IN | BODY MASS INDEX: 26.51 KG/M2

## 2021-06-01 VITALS — WEIGHT: 199.5 LBS | HEIGHT: 72 IN | BODY MASS INDEX: 27.02 KG/M2

## 2021-06-01 VITALS — BODY MASS INDEX: 26.52 KG/M2 | WEIGHT: 201 LBS

## 2021-06-02 ENCOUNTER — RECORDS - HEALTHEAST (OUTPATIENT)
Dept: ADMINISTRATIVE | Facility: CLINIC | Age: 71
End: 2021-06-02

## 2021-06-02 VITALS — WEIGHT: 207.1 LBS | BODY MASS INDEX: 27.32 KG/M2

## 2021-06-02 VITALS — WEIGHT: 204 LBS | BODY MASS INDEX: 26.91 KG/M2

## 2021-06-02 NOTE — TELEPHONE ENCOUNTER
Refill Approved    Rx renewed per Medication Renewal Policy. Medication was last renewed on 8/8/1+.    Eva Mckeon, [facility] Triage/Med Refill 10/30/2019     Requested Prescriptions   Pending Prescriptions Disp Refills     tamsulosin (FLOMAX) 0.4 mg cap [Pharmacy Med Name: TAMSULOSIN HCL 0.4 MG CAPSULE] 180 capsule 0     Sig: TAKE 2 CAPSULES (0.8 MG TOTAL) BY MOUTH DAILY AFTER SUPPER.       Alfuzosin/Tamsulosin/Silodosin Refill Protocol  Passed - 10/30/2019  2:18 AM        Passed - PCP or prescribing provider visit in past 12 months       Last office visit with prescriber/PCP: 5/15/2019 Rock Hicks MD OR same dept: 5/15/2019 Rock Hicks MD OR same specialty: 5/15/2019 Rock Hicks MD  Last physical: Visit date not found Last MTM visit: Visit date not found   Next visit within 3 mo: Visit date not found  Next physical within 3 mo: Visit date not found  Prescriber OR PCP: Rcok Hicks MD  Last diagnosis associated with med order: 1. Urinary Frequency More Than Twice At Night (Nocturia)  - tamsulosin (FLOMAX) 0.4 mg cap [Pharmacy Med Name: TAMSULOSIN HCL 0.4 MG CAPSULE]; Take 2 capsules (0.8 mg total) by mouth daily after supper.  Dispense: 180 capsule; Refill: 0    2. Benign prostatic hyperplasia with nocturia  - tamsulosin (FLOMAX) 0.4 mg cap [Pharmacy Med Name: TAMSULOSIN HCL 0.4 MG CAPSULE]; Take 2 capsules (0.8 mg total) by mouth daily after supper.  Dispense: 180 capsule; Refill: 0    If protocol passes may refill for 12 months if within 3 months of last provider visit (or a total of 15 months).

## 2021-06-03 VITALS
BODY MASS INDEX: 27.07 KG/M2 | WEIGHT: 208 LBS | OXYGEN SATURATION: 99 % | DIASTOLIC BLOOD PRESSURE: 68 MMHG | SYSTOLIC BLOOD PRESSURE: 128 MMHG | HEART RATE: 67 BPM

## 2021-06-03 VITALS — BODY MASS INDEX: 25.41 KG/M2 | HEIGHT: 74 IN | WEIGHT: 198 LBS

## 2021-06-03 VITALS — WEIGHT: 202.2 LBS | BODY MASS INDEX: 26.32 KG/M2

## 2021-06-03 VITALS — WEIGHT: 201 LBS | BODY MASS INDEX: 28.14 KG/M2 | HEIGHT: 71 IN

## 2021-06-03 NOTE — PROGRESS NOTES
Assessment:  1.  Hypertension, controlled.  2.  Benign prostatic hypertrophy.  3.  Underlying multiple sclerosis.  4.  Episodic left knee pain connected with when he feels need to urinate.  5.  Underlying chronic lymphocytic leukemia.    Plan: Continue current medication.  Refills as needed.  Follow-up in 6 months.  Check basic metabolic profile today.  He follows with Dr. Bautista, oncology every 6 months.  Continue healthy diet and exercise as tolerated etc.  He will continue to minimize alcohol and caffeine since that has helped his nocturia.    Subjective: 69-year-old male presenting for follow-up on the above.  Regarding hypertension no headaches dizziness or leg swelling.  Regarding the urination, he notes that the tamsulosin has helped, he actually is only taking 1 pill now instead of 2 pills.  He notes that by cutting back on the caffeine and alcohol and by taking the tamsulosin it has helped his urination.  He typically only urinates about 3 times during the day.  But he will get up at night 3-4 times to urinate.  He does feel is definitely better than what it had been.  He notes the multiple sclerosis has been stable.  He says the neurologist told him that it may be that it would just stay the same the rest of his life.  He does note that he can get a left knee pain that can happen when he feels the need to urinate and it can clear up after he urinates.  He does not have any swelling in the knee.  No past medical history on file.  Allergies   Allergen Reactions     Venom-Honey Bee      Current Outpatient Medications   Medication Sig Dispense Refill     acetaminophen-codeine (TYLENOL #3) 300-30 mg per tablet Take 1 tablet by mouth every 6 (six) hours as needed.              amLODIPine (NORVASC) 5 MG tablet Take 1 tablet (5 mg total) by mouth daily. 90 tablet 3     budesonide (ENTOCORT EC) 3 mg 24 hr capsule 1 CAP DAILY  3     calcium carbonate-vitamin D3 500 mg(1,250mg) -400 unit Tab Take 2 tablets by mouth  daily.              CHOLECALCIFEROL, VITAMIN D3, (VITAMIN D3 ORAL) Take 2,000 Units by mouth daily.       cyanocobalamin (VITAMIN B-12) 250 MCG tablet Take 250 mcg by mouth daily.       magnesium 250 mg Tab Take 2 tablets by mouth daily.       OMEGA-3 FATTY ACIDS (FISH OIL CONCENTRATE ORAL) Take 1,250 mg by mouth daily.       tamsulosin (FLOMAX) 0.4 mg cap TAKE 2 CAPSULES (0.8 MG TOTAL) BY MOUTH DAILY AFTER SUPPER. 180 capsule 1     No current facility-administered medications for this visit.      All other review of systems are negative for any other changes.    Objective:/68   Pulse 67   Wt 208 lb (94.3 kg)   SpO2 99%   BMI 27.07 kg/m    H ENT examination is negative.  Neck supple without adenopathy or thyromegaly.  Lungs clear.  Heart regular rate and rhythm without murmur.  Abdomen shows no masses tenderness or hepatosplenomegaly.  No pedal edema.  He does ambulate carefully because of the weakness in the left leg related to the multiple sclerosis.

## 2021-06-05 NOTE — PROGRESS NOTES
[facility] Hematology and Oncology Progress Note    Patient: Shivam Jansen  MRN: [id number]  Date of Service: January 27, 2020      Assessment and Plan:    1.  CLL: Slight increase in his lymphocyte count compared to previous values.  Neutrophil count is also up a little bit secondary to bursitis which may be playing a role.  We will continue to monitor every 6 months.  He is otherwise asymptomatic and has no other clinical signs or symptoms of progression.  Treatment is not indicated at this time.  I did take a few minutes to explain to them that the natural history of CLL is one of increasing counts and that may be happening some now.    I asked him to call his dermatologist for his annual screening.    2.  Immunizations:  Pneumo Conj 13-V on 7/11/2018.  Pneumo Polysac 23-V on 12/14/2015     ECOG Performance   ECOG Performance Status: 0    Distress Assessment  Distress Assessment Score: No distress    Pain  Currently in Pain: Yes  Pain Score (Initial OR Reassessment): 8  Location: left shoulder    Diagnosis:    Chronic lymphocytic leukemia. CHEEK stage 0. Diagnosed 5/2010. Cytogenetic shows a 13q (better prognosis).    Treatment:    Observation.    Interim History:    Shayan returns today for a follow-up visit.  Was last seen about 6 months ago.  Overall doing okay.  Has some acute bursitis in the left shoulder.  No fevers, chills, night sweats.    Review of Systems:    Constitutional  Constitutional (WDL): All constitutional elements are within defined limits  Neurosensory  Neurosensory (WDL): All neurosensory elements are within defined limits  Cardiovascular  Cardiovascular (WDL): All cardiovascular elements are within defined limits  Pulmonary  Respiratory (WDL): Exceptions to WDL  Cough: Mild symptoms, nonprescription intervention indicated(just getting over a recent cold)  Dyspnea: None  Hypoxia: None  Gastrointestinal  Gastrointestinal (WDL): All gastrointestinal elements are within defined  limits  Genitourinary  Genitourinary (WDL): All genitourinary elements are within defined limits  Integumentary  Integumentary (WDL): All integumentary elements are within defined limits  Patient Coping  Patient Coping: Accepting;Open/discussion  Accompanied by  Accompanied by: Alone    Past History:    No past medical history on file.     Physical Exam:    Recent Vitals 1/27/2020   Height -   Weight -   BSA (m2) -   /62   Pulse -   Temp -   Temp src -   SpO2 -   Some recent data might be hidden     General: patient appears stated age of 69 y.o.. Nontoxic and in no distress.   HEENT: Head: atraumatic, normocephalic. Sclerae anicteric.  Oropharynx clear.  Mucous membranes moist.  Chest:  Normal respiratory effort.  Clear to auscultation bilaterally.  Cardiac:  No edema.  Regular rate and rhythm with no murmur.  Abdomen: abdomen is soft, non-distended.  No palpable hepatosplenomegaly.  Extremities: normal tone and muscle bulk.  Skin: no lesions or rash. Warm and dry.   CNS: alert and oriented. Grossly non-focal.   Psychiatric: normal mood and affect.     Lab Results:    Recent Results (from the past 24 hour(s))   Comprehensive Metabolic Panel    Collection Time: 01/27/20  1:20 PM   Result Value Ref Range    Sodium 132 (L) 136 - 145 mmol/L    Potassium 4.7 3.5 - 5.0 mmol/L    Chloride 96 (L) 98 - 107 mmol/L    CO2 27 22 - 31 mmol/L    Anion Gap, Calculation 9 5 - 18 mmol/L    Glucose 118 70 - 125 mg/dL    BUN 12 8 - 22 mg/dL    Creatinine 0.79 0.70 - 1.30 mg/dL    GFR MDRD Af Amer >60 >60 mL/min/1.73m2    GFR MDRD Non Af Amer >60 >60 mL/min/1.73m2    Bilirubin, Total 0.4 0.0 - 1.0 mg/dL    Calcium 9.2 8.5 - 10.5 mg/dL    Protein, Total 6.5 6.0 - 8.0 g/dL    Albumin 3.5 3.5 - 5.0 g/dL    Alkaline Phosphatase 102 45 - 120 U/L    AST 14 0 - 40 U/L    ALT 16 0 - 45 U/L   HM1 (CBC with Diff)    Collection Time: 01/27/20  1:20 PM   Result Value Ref Range    WBC 54.9 (HH) 4.0 - 11.0 thou/uL    RBC 4.04 (L) 4.40 - 6.20  mill/uL    Hemoglobin 11.5 (L) 14.0 - 18.0 g/dL    Hematocrit 37.0 (L) 40.0 - 54.0 %    MCV 92 80 - 100 fL    MCH 28.5 27.0 - 34.0 pg    MCHC 31.1 (L) 32.0 - 36.0 g/dL    RDW 14.3 11.0 - 14.5 %    Platelets 214 140 - 440 thou/uL    MPV 8.5 8.5 - 12.5 fL   Manual Differential    Collection Time: 01/27/20  1:20 PM   Result Value Ref Range    Total Neutrophils % 18 (L) 50 - 70 %    Lymphocytes % 81 (H) 20 - 40 %    Monocytes % 1 (L) 2 - 10 %    Eosinophils %  0 0 - 6 %    Basophils % 0 0 - 2 %    Total Neutrophils Absolute 9.9 (H) 2.0 - 7.7 thou/ul    Lymphocytes Absolute 44.5 (H) 0.8 - 4.4 thou/uL    Monocytes Absolute 0.5 0.0 - 0.9 thou/uL    Eosinophils Absolute 0.0 0.0 - 0.4 thou/uL    Basophils Absolute 0.0 0.0 - 0.2 thou/uL    Platelet Estimate Normal Normal     Imaging:    No results found.       Signed by: Marco Bautista MD

## 2021-06-05 NOTE — TELEPHONE ENCOUNTER
Refill Approved    Rx renewed per Medication Renewal Policy. Medication was last renewed on 10/30/19.    Lisa Elias, [facility] Triage/Med Refill 2/2/2020     Requested Prescriptions   Pending Prescriptions Disp Refills     tamsulosin (FLOMAX) 0.4 mg cap [Pharmacy Med Name: TAMSULOSIN HCL 0.4 MG CAPSULE] 90 capsule 1     Sig: TAKE 1 CAPSULE (0.4 MG TOTAL) BY MOUTH DAILY AFTER SUPPER.       Alfuzosin/Tamsulosin/Silodosin Refill Protocol  Passed - 2/2/2020  1:01 AM        Passed - PCP or prescribing provider visit in past 12 months       Last office visit with prescriber/PCP: 11/13/2019 Rock Hicks MD OR same dept: 11/13/2019 Rock Hicks MD OR same specialty: 11/13/2019 Rock Hicks MD  Last physical: Visit date not found Last MTM visit: Visit date not found   Next visit within 3 mo: Visit date not found  Next physical within 3 mo: Visit date not found  Prescriber OR PCP: Rock Hicks MD  Last diagnosis associated with med order: 1. Urinary Frequency More Than Twice At Night (Nocturia)  - tamsulosin (FLOMAX) 0.4 mg cap [Pharmacy Med Name: TAMSULOSIN HCL 0.4 MG CAPSULE]; TAKE 1 CAPSULE (0.4 MG TOTAL) BY MOUTH DAILY AFTER SUPPER.  Dispense: 90 capsule; Refill: 1    2. Benign prostatic hyperplasia with nocturia  - tamsulosin (FLOMAX) 0.4 mg cap [Pharmacy Med Name: TAMSULOSIN HCL 0.4 MG CAPSULE]; TAKE 1 CAPSULE (0.4 MG TOTAL) BY MOUTH DAILY AFTER SUPPER.  Dispense: 90 capsule; Refill: 1    If protocol passes may refill for 12 months if within 3 months of last provider visit (or a total of 15 months).

## 2021-06-07 NOTE — PROGRESS NOTES
"Shivam Jansen is a 70 y.o. male who is being evaluated via a billable video visit.      The patient has been notified of following:     \"This video visit will be conducted via a call between you and your physician/provider. We have found that certain health care needs can be provided without the need for an in-person physical exam.  This service lets us provide the care you need with a video conversation.  If a prescription is necessary we can send it directly to your pharmacy.  If lab work is needed we can place an order for that and you can then stop by our lab to have the test done at a later time.    Video visits are billed at different rates depending on your insurance coverage. Please reach out to your insurance provider with any questions.    If during the course of the call the physician/provider feels a video visit is not appropriate, you will not be charged for this service.\"    Patient has given verbal consent to a Video visit? Yes    Patient would like to receive their AVS by AVS Preference: Mail a copy.    Patient would like the video invitation sent by: Text to cell phone: [phone]    Will anyone else be joining your video visit? No        Video Start Time: 11:52 a.m.    Additional provider notes:     Assessment:  1.  Left arm shingles consistent with cervical dermatome.    Plan: Valtrex 1 g-1 p.o. 3 times daily #21, then use gabapentin 100 mg-1 p.o. 3 times daily as needed for nerve type pain or sensitivity-#50 refillable x1.  Then follow-up if not gradually improving here over this next 2 to 4 weeks.  He understands and agrees.    Subjective: 70-year-old male presenting for evaluation of rash in the left arm.  He notes that he actually started with having some discomfort and funny feelings in that arm several days before he started seeing the rash.  He has seen the rash in the forearm including behind the elbow as well as some on the upper arm.  He does not have any rash elsewhere on the body.  " He thought there might be one spot on the right arm but it never looked the way that this rash on the left arm looks.  He notes that the pain is actually better now.  So it is been a total duration about 1-1/2 weeks.  But he still has sensitivity when things touch the skin in the area.  Because of the COVID-19 pandemic, this visit was conducted as a video visit using the [employer] roman because the [employer] video did not work.  Patient Active Problem List   Diagnosis     Urinary Frequency More Than Twice At Night (Nocturia)     Multiple Sclerosis     Chronic Lymphocytic Leukemia     Osteoarthritis of left knee     Collagenous colitis     Benign essential hypertension     No past medical history on file.  Allergies   Allergen Reactions     Venom-Honey Bee      Current Outpatient Medications on File Prior to Visit   Medication Sig Dispense Refill     acetaminophen-codeine (TYLENOL #3) 300-30 mg per tablet Take 1 tablet by mouth every 6 (six) hours as needed.              amLODIPine (NORVASC) 5 MG tablet Take 1 tablet (5 mg total) by mouth daily. 90 tablet 3     budesonide (ENTOCORT EC) 3 mg 24 hr capsule 1 CAP DAILY  3     calcium carbonate-vitamin D3 500 mg(1,250mg) -400 unit Tab Take 2 tablets by mouth daily.              CHOLECALCIFEROL, VITAMIN D3, (VITAMIN D3 ORAL) Take 2,000 Units by mouth daily.       cyanocobalamin (VITAMIN B-12) 250 MCG tablet Take 250 mcg by mouth daily.       magnesium 250 mg Tab Take 2 tablets by mouth daily.       OMEGA-3 FATTY ACIDS (FISH OIL CONCENTRATE ORAL) Take 1,250 mg by mouth daily.       tamsulosin (FLOMAX) 0.4 mg cap TAKE 1 CAPSULE (0.4 MG TOTAL) BY MOUTH DAILY AFTER SUPPER. 90 capsule 3     No current facility-administered medications on file prior to visit.      He does not smoke.    Objective:  The left arm rash looks to be in a dermatomal type distribution scattered, with a main cluster of what appear to be shingles just posterior to the left elbow area.  He also did have a  small area of bruising but most of the rash was not bruising.  He is alert with clear speech.  His wife helped him use the phone.  The video quality was affected by the phone not having great image quality on his and in terms of the camera function.      Video-Visit Details    Type of service:  Video Visit    Video End Time (time video stopped): 12:08  Originating Location (pt. Location): Home    Distant Location (provider location):  [facility]     Mode of Communication:    Video conference via [employer]

## 2021-06-08 NOTE — PROGRESS NOTES
"Shivam Jansen is a 70 y.o. male who is being evaluated via a billable telephone visit.      The patient has been notified of following:     \"This telephone visit will be conducted via a call between you and your physician/provider. We have found that certain health care needs can be provided without the need for a physical exam.  This service lets us provide the care you need with a short phone conversation.  If a prescription is necessary we can send it directly to your pharmacy.  If lab work is needed we can place an order for that and you can then stop by our lab to have the test done at a later time.    Telephone visits are billed at different rates depending on your insurance coverage. During this emergency period, for some insurers they may be billed the same as an in-person visit.  Please reach out to your insurance provider with any questions.    If during the course of the call the physician/provider feels a telephone visit is not appropriate, you will not be charged for this service.\"    Patient has given verbal consent to a Telephone visit? Yes    What phone number would you like to be contacted at? [phone]    Patient would like to receive their AVS by AVS Preference: Sally.    Additional provider notes:     Assessment/Plan:  1. Herpes zoster without complication     2. Benign essential hypertension  amLODIPine (NORVASC) 5 MG tablet     I explained he can get the refill on the gabapentin that had already been sent with the original prescription.  He will use the gabapentin 100 mg mostly at bedtime for help with sleep and with the way that he is improved is likely he will not need more but he certainly can let us know if he does need a refill down the line.  I did refill the amlodipine for him.  With the labs that he has had done as a part of his visit with Dr. Bautista in January I do not think he needs any lab at this time.  I explained that I am retiring in 3 months.  I recommend that he get " scheduled in the fall i.e. September or October for a Medicare annual wellness visit with 1 of the other clinicians here at the clinic and explained the options for him.  He understands and agrees.  He follows with his neurologist and the neurologist does prescribe his occasional use of the Tylenol with codeine.  He has refills on the tamsulosin.  He will call or return earlier as needed.    Subjective: 70-year-old male presenting both for follow-up on the shingles as well as regarding follow-up on hypertension.  He notes that the rash is definitely improved.  He does continue to feel a funny feeling or an itchy feeling that can bother him more at night for getting to sleep.  He finished the valacyclovir.  He is only taking the gabapentin 100 mg at bedtime but just ran out of that.  He did not realize that he had a refill on the earlier prescription.  Regarding hypertension no headaches or dizziness.  He is taking the amlodipine 5 mg daily.  Regarding his urination that is going okay and he takes the tamsulosin.  He follows with neurology for his multiple sclerosis and they have given him prescription for Tylenol with codeine for occasional pains related to the effect of that.  Patient Active Problem List   Diagnosis     Urinary Frequency More Than Twice At Night (Nocturia)     Multiple Sclerosis     Chronic Lymphocytic Leukemia     Osteoarthritis of left knee     Collagenous colitis     Benign essential hypertension     No past medical history on file.  Allergies   Allergen Reactions     Venom-Honey Bee      Current Outpatient Medications   Medication Sig Dispense Refill     acetaminophen-codeine (TYLENOL #3) 300-30 mg per tablet Take 1 tablet by mouth every 6 (six) hours as needed.              amLODIPine (NORVASC) 5 MG tablet Take 1 tablet (5 mg total) by mouth daily. 90 tablet 1     budesonide (ENTOCORT EC) 3 mg 24 hr capsule 1 CAP DAILY  3     calcium carbonate-vitamin D3 500 mg(1,250mg) -400 unit Tab Take 2  tablets by mouth daily.              CHOLECALCIFEROL, VITAMIN D3, (VITAMIN D3 ORAL) Take 2,000 Units by mouth daily.       cyanocobalamin (VITAMIN B-12) 250 MCG tablet Take 250 mcg by mouth daily.       magnesium 250 mg Tab Take 2 tablets by mouth daily.       OMEGA-3 FATTY ACIDS (FISH OIL CONCENTRATE ORAL) Take 1,250 mg by mouth daily.       tamsulosin (FLOMAX) 0.4 mg cap TAKE 1 CAPSULE (0.4 MG TOTAL) BY MOUTH DAILY AFTER SUPPER. 90 capsule 3     gabapentin (NEURONTIN) 100 MG capsule Take 100 mg by mouth 3 (three) times a day as needed (pain or sensitivity of skin). 50 capsule 1     No current facility-administered medications for this visit.      He does not smoke.  All other review of systems are negative for any other changes.    Objective:Wt 200 lb (90.7 kg)   BMI 26.03 kg/m          Phone call duration:  16 minutes    Rock Hicks MD

## 2021-06-10 NOTE — PROGRESS NOTES
[facility] Hematology and Oncology Progress Note    Patient: Shivam Jansen  MRN: [id number]  Date of Service: July 27, 2020      Assessment and Plan:    1.  CLL: CBC was reviewed.  Lymphocyte count has remained stable.  He is feeling well.  No B symptoms noted.  No indication for treatment.  Continue follow-up every 6 months.  Neutrophil count is normalized.  He did see a dermatologist and had some AK's taken off.       2.  Immunizations:  Pneumo Conj 13-V on 7/11/2018.  Pneumo Polysac 23-V on 12/14/2015     ECOG Performance   ECOG Performance Status: 0(slower)    Distress Assessment  Distress Assessment Score: 1    Pain  Currently in Pain: No/denies    Diagnosis:    Chronic lymphocytic leukemia. CHEEK stage 0. Diagnosed 5/2010. Cytogenetic shows a 13q (better prognosis).    Treatment:    Observation.    Interim History:    Shayan returns today for a follow-up visit.  Was last seen about 6 months ago.  Continues to do okay.  He has had a few falls in the interim, one resulting in torn meniscus.  No fevers, chills, night sweats.  He has not noted any palpable lumps or bumps.  No abdominal pain or unintentional weight loss.    Review of Systems:    Constitutional  Constitutional (WDL): All constitutional elements are within defined limits  Neurosensory  Neurosensory (WDL): Exceptions to WDL  Cardiovascular  Cardiovascular (WDL): Exceptions to WDL  Palpitations: None  Edema: Yes  Edema Limbs: 5 - 10% inter-limb discrepancy in volume or circumference at point of greatest visible difference, swelling or obscuration of anatomic architecture on close inspection(Rt knee and Rt lower leg, s/p trauma)  Pulmonary  Respiratory (WDL): Within Defined Limits  Gastrointestinal  Gastrointestinal (WDL): All gastrointestinal elements are within defined limits  Genitourinary  Genitourinary (WDL): Exceptions to WDL(some incontinence late at night)  Urinary Frequency: None  Urinary Retention: None  Urinary Tract Pain:  None  Integumentary  Integumentary (WDL): Exceptions to WDL(easy bruising)  Patient Coping  Patient Coping: Open/discussion  Accompanied by  Accompanied by: Alone    Past History:    No past medical history on file.     Physical Exam:    Recent Vitals 7/27/2020   Weight -   BSA (m2) -   /70   Pulse 63   Temp -   Temp src -   SpO2 -   Some recent data might be hidden     General: patient appears stated age of 70 y.o.. Nontoxic and in no distress.   HEENT: Head: atraumatic, normocephalic. Sclerae anicteric.   Chest:  Normal respiratory effort.    Cardiac:  No edema.   Abdomen: abdomen is soft, non-distended.  No palpable hepatosplenomegaly.  Extremities: normal tone and muscle bulk.  Skin: no lesions or rash. Warm and dry.   CNS: alert and oriented. Grossly non-focal.   Psychiatric: normal mood and affect.   Nodes: No palpable cervical, supraclavicular, axillary or inguinal nodes.    Lab Results:    Recent Results (from the past 240 hour(s))   Comprehensive Metabolic Panel    Collection Time: 07/27/20  1:00 PM   Result Value Ref Range    Sodium 133 (L) 136 - 145 mmol/L    Potassium 4.4 3.5 - 5.0 mmol/L    Chloride 100 98 - 107 mmol/L    CO2 27 22 - 31 mmol/L    Anion Gap, Calculation 6 5 - 18 mmol/L    Glucose 102 70 - 125 mg/dL    BUN 11 8 - 22 mg/dL    Creatinine 0.87 0.70 - 1.30 mg/dL    GFR MDRD Af Amer >60 >60 mL/min/1.73m2    GFR MDRD Non Af Amer >60 >60 mL/min/1.73m2    Bilirubin, Total 0.3 0.0 - 1.0 mg/dL    Calcium 9.2 8.5 - 10.5 mg/dL    Protein, Total 6.3 6.0 - 8.0 g/dL    Albumin 3.7 3.5 - 5.0 g/dL    Alkaline Phosphatase 90 45 - 120 U/L    AST 12 0 - 40 U/L    ALT 13 0 - 45 U/L   HM1 (CBC with Diff)    Collection Time: 07/27/20  1:00 PM   Result Value Ref Range    WBC 43.5 (HH) 4.0 - 11.0 thou/uL    RBC 3.93 (L) 4.40 - 6.20 mill/uL    Hemoglobin 11.5 (L) 14.0 - 18.0 g/dL    Hematocrit 36.1 (L) 40.0 - 54.0 %    MCV 92 80 - 100 fL    MCH 29.3 27.0 - 34.0 pg    MCHC 31.9 (L) 32.0 - 36.0 g/dL    RDW  14.6 (H) 11.0 - 14.5 %    Platelets 210 140 - 440 thou/uL    MPV 9.1 8.5 - 12.5 fL   Manual Differential    Collection Time: 07/27/20  1:00 PM   Result Value Ref Range    Total Neutrophils % 12 (L) 50 - 70 %    Lymphocytes % 86 (H) 20 - 40 %    Monocytes % 2 2 - 10 %    Eosinophils %  0 0 - 6 %    Basophils % 0 0 - 2 %    Total Neutrophils Absolute 5.2 2.0 - 7.7 thou/ul    Lymphocytes Absolute 37.4 (H) 0.8 - 4.4 thou/uL    Monocytes Absolute 0.9 0.0 - 0.9 thou/uL    Eosinophils Absolute 0.0 0.0 - 0.4 thou/uL    Basophils Absolute 0.0 0.0 - 0.2 thou/uL    Platelet Estimate Normal Normal     Imaging:    No results found.       Signed by: Marco Bautista MD

## 2021-06-10 NOTE — TELEPHONE ENCOUNTER
Gabapentin is not on current medication list.     Outpatient Medication Detail      Disp  Refills  Start  End  PRIETO    gabapentin (NEURONTIN) 100 MG capsule (Discontinued)  50 capsule  1  4/27/2020 7/27/2020  --    Sig - Route: Take 100 mg by mouth 3 (three) times a day as needed (pain or sensitivity of skin). - Oral    Sent to pharmacy as: gabapentin 100 mg capsule (NEURONTIN)    Reason for Discontinue: Therapy completed    E-Prescribing Status: Receipt confirmed by pharmacy (4/27/2020 12:10 PM CDT)    gabapentin (NEURONTIN) 100 MG capsule [[id number]]     Electronically signed by: Rock Hicks MD on 04/27/20 1209  Status: Discontinued    Ordering user: Rock Hicks MD 04/27/20 1209  Authorized by: Rock Hicks MD    PRN Comment: pain or sensitivity of skin    Frequency: TID PRN 04/27/20 - 07/27/20  Discontinued by: Cary Ny RN 07/27/20 1320 [Therapy completed]    Diagnoses   Herpes zoster without complication [B02.9]

## 2021-06-10 NOTE — TELEPHONE ENCOUNTER
I have not prescribed gabapentin for him.  It is very possible that his neurologist prescribed that.  The pharmacy should redirect it to his neurologist.  If he is wanting to get it from our clinic he will need to be seen for an appointment and he will need to see 1 of the other physicians since I am retiring tomorrow.

## 2021-06-10 NOTE — TELEPHONE ENCOUNTER
RN cannot approve Refill Request    RN can NOT refill this medication medication not on med list, and was discontinued at his last visit with Hem/Onc. Last office visit: 11/13/2019 Rock Hicks MD Last Physical: Visit date not found Last MTM visit: Visit date not found Last visit same specialty: 11/13/2019 Rock Hicks MD.  Next visit within 3 mo: Visit date not found  Next physical within 3 mo: Visit date not found      Concha Castillo, [facility] Triage/Med Refill 7/31/2020    Requested Prescriptions   Pending Prescriptions Disp Refills     gabapentin (NEURONTIN) 100 MG capsule [Pharmacy Med Name: GABAPENTIN 100 MG CAPSULE] 50 capsule 1     Sig: TAKE 100 MG BY MOUTH 3 (THREE) TIMES A DAY AS NEEDED (PAIN OR SENSITIVITY OF SKIN).       Gabapentin/Levetiracetam/Tiagabine Refill Protocol  Passed - 7/30/2020 12:30 PM        Passed - PCP or prescribing provider visit in past 12 months or next 3 months     Last office visit with prescriber/PCP: 11/13/2019 Rock Hicks MD OR same dept: 11/13/2019 Rock Hicks MD OR same specialty: 11/13/2019 Rock Hicks MD  Last physical: Visit date not found Last MTM visit: Visit date not found   Next visit within 3 mo: Visit date not found  Next physical within 3 mo: Visit date not found  Prescriber OR PCP: Rock Hicks MD  Last diagnosis associated with med order: 1. Herpes zoster without complication  - gabapentin (NEURONTIN) 100 MG capsule [Pharmacy Med Name: GABAPENTIN 100 MG CAPSULE]; Take 100 mg by mouth 3 (three) times a day as needed (pain or sensitivity of skin).  Dispense: 50 capsule; Refill: 1    If protocol passes may refill for 12 months if within 3 months of last provider visit (or a total of 15 months).

## 2021-06-10 NOTE — TELEPHONE ENCOUNTER
Patient called wondering if he is able to get his shingles immunization. I told him I think it should be ok but I want to check with Dr. Bautista for approval.  Patient asked for return call to [phone].  Message to Dr Bautista/KIKO Leonard RN    Called patient to let him know Dr. Bautista said it was ok to get the Shingles injection.  He asked if he could get injection here. I recommended PCP or his pharmacy. He verbalized understanding and thanked me for the call/KIKO Leonard RN

## 2021-06-11 NOTE — PROGRESS NOTES
"Fernandez from lab called with critical lab value \"WBC=42.8\".  She also gave me \"Hgb=11.3. Eze=966.  Prelim ANC=5.5\".  Dr. Bautista notified of these results. KIKO Leonard RN.  "

## 2021-06-13 NOTE — TELEPHONE ENCOUNTER
"[place] calling from . [facility]\" I have a critical lab/ WBC. It's 49.7.\"  Paged on call  ([facility])with results at 7:19 pm  Jeannette Pradhan RN  [facility]    "

## 2021-06-14 NOTE — PROGRESS NOTES
[facility] Hematology and Oncology Progress Note    Patient: Shivam Jansen  MRN: [id number]  Date of Service: December 28, 2020.       Assessment and Plan:    1.  CLL: Numbers are reviewed.  Hematologic indices are generally stable.  Remains no evidence of disease progression or indication for treatment at this time.  We will continue to see him 6-month intervals.  Continue yearly dermatology evaluations.    2.  Immunizations:  Pneumo Conj 13-V on 7/11/2018.  Pneumo Polysac 23-V on 12/14/2015     ECOG Performance   ECOG Performance Status: 0    Distress Assessment  Distress Assessment Score: No distress    Pain  Currently in Pain: No/denies    Diagnosis:    Chronic lymphocytic leukemia. CHEEK stage 0. Diagnosed 5/2010. Cytogenetic shows a 13q (better prognosis).    Treatment:    Observation.    Interim History:    Shayan returns today for a follow-up visit.  Was last seen about 6 months ago.  Doing okay today with no acute complaints.  No significant changes in his health over the past 6 months.  No change in medications or dosing.  No fevers, chills, night sweats.  He has not noticed any new lumps or bumps.    Review of Systems:    Constitutional  Constitutional (WDL): All constitutional elements are within defined limits  Neurosensory  Neurosensory (WDL): Exceptions to WDL  Peripheral Motor Neuropathy: None  Ataxia: Asymptomatic, clinical or diagnostic observations only, intervention not indicated(uses cane)  Peripheral Sensory Neuropathy: None  Confusion: None  Syncope: None  Cardiovascular  Cardiovascular (WDL): All cardiovascular elements are within defined limits  Pulmonary  Respiratory (WDL): Within Defined Limits  Gastrointestinal  Gastrointestinal (WDL): All gastrointestinal elements are within defined limits  Genitourinary  Genitourinary (WDL): All genitourinary elements are within defined limits  Integumentary  Integumentary (WDL): All integumentary elements are within defined limits  Patient Coping  Patient  Coping: Accepting;Open/discussion  Accompanied by  Accompanied by: Alone    Past History:    No past medical history on file.     Physical Exam:    Recent Vitals 12/28/2020   Weight 203 lbs 11 oz   BSA (m2) 2.19 m2   /64   Pulse 65   Temp 97.6   Temp src 1   SpO2 99   Some recent data might be hidden     General: patient appears stated age of 70 y.o.. Nontoxic and in no distress.   HEENT: Head: atraumatic, normocephalic. Sclerae anicteric.   Chest:  Normal respiratory effort.  Clear to auscultation bilaterally.  Cardiac:  No edema.  Regular rate and rhythm with no murmur.  Abdomen: abdomen is soft, non-distended.  No palpable hepatosplenomegaly.  Extremities: normal tone and muscle bulk.  Skin: no lesions or rash. Warm and dry.   CNS: alert and oriented. Grossly non-focal.   Psychiatric: normal mood and affect.   Nodes: No palpable cervical, supraclavicular, or axillary nodes.    Lab Results:    Recent Results (from the past 240 hour(s))   Comprehensive Metabolic Panel    Collection Time: 12/28/20 12:57 PM   Result Value Ref Range    Sodium 130 (L) 136 - 145 mmol/L    Potassium 4.4 3.5 - 5.0 mmol/L    Chloride 95 (L) 98 - 107 mmol/L    CO2 26 22 - 31 mmol/L    Anion Gap, Calculation 9 5 - 18 mmol/L    Glucose 94 70 - 125 mg/dL    BUN 11 8 - 22 mg/dL    Creatinine 0.78 0.70 - 1.30 mg/dL    GFR MDRD Af Amer >60 >60 mL/min/1.73m2    GFR MDRD Non Af Amer >60 >60 mL/min/1.73m2    Bilirubin, Total 0.4 0.0 - 1.0 mg/dL    Calcium 9.2 8.5 - 10.5 mg/dL    Protein, Total 6.8 6.0 - 8.0 g/dL    Albumin 4.0 3.5 - 5.0 g/dL    Alkaline Phosphatase 103 45 - 120 U/L    AST 18 0 - 40 U/L    ALT 18 0 - 45 U/L   HM1 (CBC with Diff)    Collection Time: 12/28/20 12:57 PM   Result Value Ref Range    WBC 49.1 (HH) 4.0 - 11.0 thou/uL    RBC 4.02 (L) 4.40 - 6.20 mill/uL    Hemoglobin 11.4 (L) 14.0 - 18.0 g/dL    Hematocrit 35.6 (L) 40.0 - 54.0 %    MCV 89 80 - 100 fL    MCH 28.4 27.0 - 34.0 pg    MCHC 32.0 32.0 - 36.0 g/dL    RDW 13.7  11.0 - 14.5 %    Platelets 219 140 - 440 thou/uL    MPV 8.9 8.5 - 12.5 fL   Manual Differential    Collection Time: 12/28/20 12:57 PM   Result Value Ref Range    Total Neutrophils % 9 (L) 50 - 70 %    Lymphocytes % 89 (H) 20 - 40 %    Monocytes % 2 2 - 10 %    Eosinophils %  0 0 - 6 %    Basophils % 0 0 - 2 %    Immature Granulocyte % - Manual 0 <=0 %    Total Neutrophils Absolute 4.4 2.0 - 7.7 thou/ul    Lymphocytes Absolute 43.7 (H) 0.8 - 4.4 thou/uL    Monocytes Absolute 1.0 (H) 0.0 - 0.9 thou/uL    Eosinophils Absolute 0.0 0.0 - 0.4 thou/uL    Basophils Absolute 0.0 0.0 - 0.2 thou/uL    Immature Granulocyte Absolute - Manual 0.0 <=0.0 thou/uL    Platelet Estimate Normal Normal     Imaging:    No results found.       Signed by: Marco Bautista MD

## 2021-06-15 NOTE — PROGRESS NOTES
[facility] Hematology and Oncology Progress Note    Patient: Shivam Jansen  MRN: [id number]  Date of Service:  January 15, 2018      Assessment and Plan:    1.  CLL: His numbers were reviewed.  His absolute lymphocyte count is actually lower today than it was a year ago.  Hemoglobin and platelets are fine.  He is asymptomatic.  No indications for treatment.  We will continue to see him every 6 months.  He will call us in the interim if he has any new symptoms.  Questions were answered.    ECOG Performance   ECOG Performance Status: 0    Distress Assessment  Distress Assessment Score: No distress    Pain  Currently in Pain: No/denies    Diagnosis:    Chronic lymphocytic leukemia. CHEEK stage 0. Diagnosed 5/2010. Cytogenetic shows a 13q (better prognosis).    Treatment:    Observation to date.    Interim History:    Shayan returns today for a follow-up visit.  He was last seen 6 months ago.  No acute complaints today.  Doing well.  No fevers, chills, night sweats.  No abdominal pain, nausea, or vomiting.  No lower extremity edema.    Review of Systems:    Constitutional  Constitutional (WDL): All constitutional elements are within defined limits  Neurosensory  Neurosensory (WDL): All neurosensory elements are within defined limits  Cardiovascular  Cardiovascular (WDL): All cardiovascular elements are within defined limits  Pulmonary  Respiratory (WDL): Within Defined Limits  Gastrointestinal  Gastrointestinal (WDL): Exceptions to WDL (microscopic colitis - controlled)  Genitourinary  Genitourinary (WDL): Exceptions to WDL  Urinary Frequency: Present (nocturia)  Integumentary  Integumentary (WDL): All integumentary elements are within defined limits  Patient Coping  Patient Coping: Accepting  Accompanied by  Accompanied by: Family Member    Past History:    No past medical history on file.     Physical Exam:    Recent Vitals 1/15/2018   Height -   Weight 206 lbs 5 oz   BSA (m2) -   /63   Pulse 62   Temp 98.5   Temp  src 1   SpO2 97   Some recent data might be hidden     General: patient appears stated age of 67 y.o.. Nontoxic and in no distress.   HEENT: Head: atraumatic, normocephalic. Sclerae anicteric.  Oropharynx clear.  Moist mucous membranes.  Chest:  Normal respiratory effort  Cardiac:  No edema.   Abdomen: abdomen is soft, non-distended.   Extremities: normal tone and muscle bulk.  Skin: no lesions or rash. Warm and dry.   CNS: alert and oriented x3. Grossly non-focal.   Psychiatric: normal mood and affect.   Lymph nodes:  No palpable cervical, supra-clavicular, or axillary nodes bilaterally.    Lab Results:    Results for LAZARUS GONSALVES (MRN [id number]) as of 2/6/2018 17:33   Ref. Range 1/15/2018 14:38   Sodium Latest Ref Range: 136 - 145 mmol/L 138   Potassium Latest Ref Range: 3.5 - 5.0 mmol/L 4.6   Chloride Latest Ref Range: 98 - 107 mmol/L 103   CO2 Latest Ref Range: 22 - 31 mmol/L 28   Anion Gap, Calculation Latest Ref Range: 5 - 18 mmol/L 7   BUN Latest Ref Range: 8 - 22 mg/dL 10   Creatinine Latest Ref Range: 0.70 - 1.30 mg/dL 0.76   GFR MDRD Af Amer Latest Ref Range: >60 mL/min/1.73m2 >60   GFR MDRD Non Af Amer Latest Ref Range: >60 mL/min/1.73m2 >60   Calcium Latest Ref Range: 8.5 - 10.5 mg/dL 9.9   AST Latest Ref Range: 0 - 40 U/L 14   ALT Latest Ref Range: 0 - 45 U/L 19   ALBUMIN Latest Ref Range: 3.5 - 5.0 g/dL 3.6   Protein, Total Latest Ref Range: 6.0 - 8.0 g/dL 6.7   Alkaline Phosphatase Latest Ref Range: 45 - 120 U/L 82   Bilirubin, Total Latest Ref Range: 0.0 - 1.0 mg/dL 0.5   Glucose Latest Ref Range: 70 - 125 mg/dL 99   WBC Latest Ref Range: 4.0 - 11.0 thou/uL 38.9 (HH)   RBC Latest Ref Range: 4.40 - 6.20 mill/uL 4.10 (L)   Hemoglobin Latest Ref Range: 14.0 - 18.0 g/dL 12.1 (L)   Hematocrit Latest Ref Range: 40.0 - 54.0 % 37.8 (L)   MCV Latest Ref Range: 80 - 100 fL 92   MCH Latest Ref Range: 27.0 - 34.0 pg 29.5   MCHC Latest Ref Range: 32.0 - 36.0 g/dL 32.0   RDW Latest Ref Range: 11.0 - 14.5 % 14.2    Platelets Latest Ref Range: 140 - 440 thou/uL 181   MPV Latest Ref Range: 8.5 - 12.5 fL 9.1   Total Neutrophils % Latest Ref Range: 50 - 70 % 17 (L)   Lymphocytes % Latest Ref Range: 20 - 40 % 80 (H)   Monocytes % Latest Ref Range: 2 - 10 % 3   Eosinophils %  Latest Ref Range: 0 - 6 % 0   Basophils % Latest Ref Range: 0 - 2 % 0   Total Neutrophils Absolute Latest Ref Range: 2.0 - 7.7 thou/ul 6.6   Lymphocytes Absolute Latest Ref Range: 0.8 - 4.4 thou/uL 31.1 (H)   Monocytes Absolute Latest Ref Range: 0.0 - 0.9 thou/uL 1.2 (H)   Eosinophils Absolute Latest Ref Range: 0.0 - 0.4 thou/uL 0.0   Basophils Absolute Latest Ref Range: 0.0 - 0.2 thou/uL 0.0   Reactive Lymphocytes Latest Ref Range: Negative  1+ (!)     No results found for this or any previous visit (from the past 168 hour(s)).     Imaging:    No results found.      Signed by: Marco Bautista MD

## 2021-06-18 NOTE — PROGRESS NOTES
"[facility] Hematology and Oncology Progress Note    Patient: Shivam Jansen  MRN: [id number]  Date of Service:  June 25, 2018      Assessment and Plan:    1.  CLL: Counts are generally stable.  No evidence of clinical progressive disease.  Asymptomatic.  No indication for treatment at this time.  Will continue every six-month follow-up.  We again reviewed the signs and symptoms of progressive disease or Correa transformation.  All questions were answered.    ECOG Performance   ECOG Performance Status: 0    Distress Assessment  Distress Assessment Score: No distress    Pain  Currently in Pain: No/denies    Diagnosis:    Chronic lymphocytic leukemia. CHEEK stage 0. Diagnosed 5/2010. Cytogenetic shows a 13q (better prognosis).    Treatment:    Observation to date.    Interim History:    Shayan returns today for a follow-up visit.  He was last seen about 5 months ago.  In the interim he has been doing well.  No fevers, chills, night sweats.  Energy and appetite are good.  No new peripheral adenopathy.  No abdominal discomfort.    Review of Systems:    Constitutional  Constitutional (WDL): All constitutional elements are within defined limits  Neurosensory  Neurosensory (WDL): Exceptions to WDL  Peripheral Motor Neuropathy: Asymptomatic, clinical or diagnostic observations only, intervention not indicated  Cardiovascular  Cardiovascular (WDL): All cardiovascular elements are within defined limits  Pulmonary  Respiratory (WDL): Within Defined Limits  Gastrointestinal  Gastrointestinal (WDL): All gastrointestinal elements are within defined limits  Genitourinary  Genitourinary (WDL): Exceptions to WDL  Urinary Frequency: Present  Integumentary  Integumentary (WDL): All integumentary elements are within defined limits  Patient Coping  Patient Coping: Accepting  Accompanied by  Accompanied by: Family Member    Past History:    No past medical history on file.     Physical Exam:    Recent Vitals 6/25/2018   Height 6' 0\"   Weight " 199 lbs 8 oz   BSA (m2) 2.14 m2   /68   Pulse 68   Temp 97.6   Temp src 1   SpO2 99   Some recent data might be hidden     General: patient appears stated age of 68 y.o.. Nontoxic and in no distress.   HEENT: Head: atraumatic, normocephalic. Sclerae anicteric.  Oropharynx clear.  Moist mucous membranes.  Chest:  Normal respiratory effort  Cardiac:  No edema.   Abdomen: abdomen is soft, non-distended.  No palpable splenomegaly.  Extremities: normal tone and muscle bulk.  Skin: no lesions or rash. Warm and dry.   CNS: alert and oriented. Grossly non-focal.   Psychiatric: normal mood and affect.   Lymph nodes:  No palpable cervical, supra-clavicular, or axillary nodes bilaterally.    Lab Results:    Recent Results (from the past 168 hour(s))   Comprehensive Metabolic Panel   Result Value Ref Range    Sodium 135 (L) 136 - 145 mmol/L    Potassium 4.3 3.5 - 5.0 mmol/L    Chloride 99 98 - 107 mmol/L    CO2 28 22 - 31 mmol/L    Anion Gap, Calculation 8 5 - 18 mmol/L    Glucose 91 70 - 125 mg/dL    BUN 11 8 - 22 mg/dL    Creatinine 0.90 0.70 - 1.30 mg/dL    GFR MDRD Af Amer >60 >60 mL/min/1.73m2    GFR MDRD Non Af Amer >60 >60 mL/min/1.73m2    Bilirubin, Total 0.4 0.0 - 1.0 mg/dL    Calcium 10.1 8.5 - 10.5 mg/dL    Protein, Total 6.9 6.0 - 8.0 g/dL    Albumin 4.1 3.5 - 5.0 g/dL    Alkaline Phosphatase 89 45 - 120 U/L    AST 15 0 - 40 U/L    ALT 15 0 - 45 U/L   HM1 (CBC with Diff)   Result Value Ref Range    WBC 50.7 (HH) 4.0 - 11.0 thou/uL    RBC 4.38 (L) 4.40 - 6.20 mill/uL    Hemoglobin 12.8 (L) 14.0 - 18.0 g/dL    Hematocrit 39.6 (L) 40.0 - 54.0 %    MCV 90 80 - 100 fL    MCH 29.2 27.0 - 34.0 pg    MCHC 32.3 32.0 - 36.0 g/dL    RDW 14.3 11.0 - 14.5 %    Platelets 197 140 - 440 thou/uL    MPV 9.1 8.5 - 12.5 fL   Manual Differential   Result Value Ref Range    Total Neutrophils % 25 (L) 50 - 70 %    Lymphocytes % 71 (H) 20 - 40 %    Monocytes % 3 2 - 10 %    Eosinophils %  1 0 - 6 %    Basophils % 0 0 - 2 %    Total  Neutrophils Absolute 12.7 (H) 2.0 - 7.7 thou/ul    Lymphocytes Absolute 36.0 (H) 0.8 - 4.4 thou/uL    Monocytes Absolute 1.5 (H) 0.0 - 0.9 thou/uL    Eosinophils Absolute 0.5 (H) 0.0 - 0.4 thou/uL    Basophils Absolute 0.0 0.0 - 0.2 thou/uL    Platelet Estimate Normal Normal      Imaging:    No results found.      Signed by: Marco Bautista MD

## 2021-06-18 NOTE — PROGRESS NOTES
Assessment & Plan:     1. Bilateral impacted cerumen          Cerumen was removed using gentle irrigation. Tympanic membranes are intact following the procedure.  Auditory canals are normal. We discussed use of debrox drops as needed to keep the wax soft and he will follow up with any ongoing or recurrent symptoms.    Subjective:         Shivam Jansen is a 68 y.o. male whom presents for evaluation of plugged sensation in both ears, L>R. Patient denies ear pain or drainage, fever, headache, nasal congestion and sore throat. History of previous ear infections: no.      There is a prior history of cerumen impaction. The patient has not been using ear drops to loosen wax immediately prior to this visit.    The following portions of the patient's history were reviewed and updated as appropriate: allergies, current medications, past family history, past medical history, past social history, past surgical history and problem list.    Review of Systems  Pertinent items are noted in HPI.      Objective:     /76  Pulse 68  Ht 6' (1.829 m)  Wt 200 lb 3 oz (90.8 kg)  BMI 27.15 kg/m2  General     Alert, cooperative, no distress   Head:    Normocephalic, without obvious abnormality, atraumatic   Eyes:    PERRL, conjunctiva/corneas clear, EOM's intact   Ears:    canal(s) of both ears are partially obstructed with cerumen.   Nose:   Nares normal, mucosa normal, no drainage or sinus tenderness   Throat:   oropharynx clear without exudate    Neck:   Supple, no adenopathy and supple, symmetrical, trachea midline    Lungs:     clear to auscultation bilaterally   Heart:    Regular rate and rhythm,, no murmur, rub or gallop   Abdomen:     Soft, non-tender, no masses   Skin:   Skin color, texture, turgor normal, no rashes or lesions

## 2021-06-18 NOTE — PROGRESS NOTES
[facility] lab called with critical result:  WBC-50.7  Hgb-12.8  Plt-197  Prelim ANC-7.6  Dr Bautista notified at  1:53 PM.  Cary Ny RN

## 2021-06-18 NOTE — PROGRESS NOTES
Shivam is here today for OV with Dr. Bautista for ongoing mangagement of CLL. Labs drawn. Marisol Prieto

## 2021-06-19 NOTE — PROGRESS NOTES
Assessment:  1.  Hypertension, needs medication.  2.  Underlying chronic lymphocytic leukemia.  3.  Underlying multiple sclerosis.  4.  Slightly overweight.  5.  Underlying collagenous colitis but doing very well.    Plan: Check TSH, urinalysis, and give Prevnar today.  Start amlodipine 5 mg-1 p.o. daily-#90 to his pharmacy.  Follow-up in 1 month.  Return earlier as needed.  Okay to try reducing budesonide capsule to 1 pill every other day.  He can check with GI and whether or not he can try stopping that altogether, but explained it is not likely that that is the whole cause of his blood pressure here.  Recommend low sodium salt diet.  Recommend he try to watch portion sizes and try to gradually lose 5-10 pounds.  He continues to follow with hematology regarding his CLL and with neurology regarding his multiple sclerosis.    Subjective: 68-year-old male presenting for evaluation of the blood pressure issue as above.  He notes it is been in the last few months he seems to be having higher blood pressures.  At the neurologist his blood pressure was in the 160 range.  See the notes from the both the urologist and neurologist in the media section.  He denies any headaches or nausea or vomiting.  No dizziness or leg swelling.  No past medical history on file.  Allergies   Allergen Reactions     Venom-Honey Bee      Current Outpatient Prescriptions   Medication Sig Dispense Refill     acetaminophen-codeine (TYLENOL #3) 300-30 mg per tablet 1 tablet.       budesonide (ENTOCORT EC) 3 mg 24 hr capsule TAKE 3 CAPSULES BY MOUTH DAILY FOR 6 WEEKS, THEN 2 CAPS FOR 2 WEEKS, THEN 1 CAP DAILY  3     calcium carbonate-vitamin D3 500 mg(1,250mg) -400 unit Tab Take by mouth.       CHOLECALCIFEROL, VITAMIN D3, (VITAMIN D3 ORAL) Take 2,000 Units by mouth daily.       cyanocobalamin (VITAMIN B-12) 250 MCG tablet Take 250 mcg by mouth daily.       magnesium 250 mg Tab Take 2 tablets by mouth daily.       OMEGA-3 FATTY ACIDS (FISH OIL  "CONCENTRATE ORAL) Take 1,250 mg by mouth daily.       amLODIPine (NORVASC) 5 MG tablet Take 1 tablet (5 mg total) by mouth daily. TakeTake one tablet by mouth daily 90 tablet 0     No current facility-administered medications for this visit.      Note that the above listed medication is after the addition of the amlodipine today.  Has the weakness in the leg related to the multiple sclerosis as chronically.  Here with his wife Shefali today.  All other review of systems are currently negative.    Objective:/76 (Patient Site: Left Arm, Patient Position: Sitting, Cuff Size: Adult Large)  Pulse 65  Resp 14  Ht 6' 1\" (1.854 m)  Wt 200 lb (90.7 kg)  SpO2 98%  BMI 26.39 kg/m2  HEENT exam shows no acute change.  Neck supple without adenopathy or thyromegaly.  Lungs clear.  Heart regular rate and rhythm without murmur.  Abdomen shows no masses tenderness or hepatosplenomegaly.  No pedal edema.  He does walk with a single prong cane and has the limp consistent with the weakness from the mobile sclerosis.  See the results of the recent CBC and conference of profile of June 25 which showed the elevated white count consistent with chronic lymphocytic leukemia, mild anemia, but normal kidney function and liver function except for borderline serum sodium at 135.  "

## 2021-06-19 NOTE — PROGRESS NOTES
Assessment:  1.  Hypertension now controlled.  2.  Underlying multiple sclerosis.    Plan: Refilled amlodipine  5 mg-1 p.o. Daily #90.  Follow-up in 3 months.  Return earlier as needed.  Continue low-salt diet.  Continue to watch calorie intake and try to lose 5-10 pounds over the next 3 months.  Call or return earlier as needed.    Subjective: 68-year-old male presenting for follow-up on hypertension.  See the note last month.  He does not notice any trouble taking the amlodipine.  He states that he did have a little bit of puffiness occur in the right ankle off and on and states that his right leg is the one that has the main symptoms from his multiple sclerosis.  He had not noticed that swelling before starting the amlodipine but states it is not significant.  No headaches or dizziness.  No past medical history on file.  Allergies   Allergen Reactions     Venom-Honey Bee      Current Outpatient Prescriptions   Medication Sig Dispense Refill     acetaminophen-codeine (TYLENOL #3) 300-30 mg per tablet 1 tablet.       amLODIPine (NORVASC) 5 MG tablet Take 1 tablet (5 mg total) by mouth daily. 90 tablet 0     budesonide (ENTOCORT EC) 3 mg 24 hr capsule TAKE 3 CAPSULES BY MOUTH DAILY FOR 6 WEEKS, THEN 2 CAPS FOR 2 WEEKS, THEN 1 CAP DAILY  3     calcium carbonate-vitamin D3 500 mg(1,250mg) -400 unit Tab Take by mouth.       CHOLECALCIFEROL, VITAMIN D3, (VITAMIN D3 ORAL) Take 2,000 Units by mouth daily.       cyanocobalamin (VITAMIN B-12) 250 MCG tablet Take 250 mcg by mouth daily.       magnesium 250 mg Tab Take 2 tablets by mouth daily.       OMEGA-3 FATTY ACIDS (FISH OIL CONCENTRATE ORAL) Take 1,250 mg by mouth daily.       terazosin (HYTRIN) 5 MG capsule   2     No current facility-administered medications for this visit.      Here with his wife today.    Objective:/70 (Patient Site: Left Arm, Patient Position: Sitting, Cuff Size: Adult Large)  Pulse 83  Resp 16  Wt 201 lb (91.2 kg)  SpO2 97%  BMI 26.52  kg/m2  Initial blood pressure is been 144/70 and recheck blood pressure as above.  HEENT negative.  Lungs clear.  Heart regular rate and rhythm without murmur.  Abdomen shows no masses tenderness or hepatosplenomegaly.  No pitting edema at the ankle.  He does use a single prong cane for ambulation.

## 2021-06-21 ENCOUNTER — OFFICE VISIT (OUTPATIENT)
Dept: NEUROLOGY | Facility: CLINIC | Age: 71
End: 2021-06-21
Payer: COMMERCIAL

## 2021-06-21 VITALS
HEART RATE: 64 BPM | WEIGHT: 204 LBS | BODY MASS INDEX: 27.63 KG/M2 | SYSTOLIC BLOOD PRESSURE: 137 MMHG | HEIGHT: 72 IN | DIASTOLIC BLOOD PRESSURE: 66 MMHG

## 2021-06-21 DIAGNOSIS — G35 MULTIPLE SCLEROSIS (H): ICD-10-CM

## 2021-06-21 PROCEDURE — 99214 OFFICE O/P EST MOD 30 MIN: CPT | Performed by: PSYCHIATRY & NEUROLOGY

## 2021-06-21 ASSESSMENT — MIFFLIN-ST. JEOR: SCORE: 1723.34

## 2021-06-21 NOTE — PROGRESS NOTES
Assessment:  1.  Hypertension controlled.  2.  Underlying multiple sclerosis.  3.  Underlying chronic lymphocytic leukemia.    Plan: Check basic profile.  Continue amlodipine 5 mg-#90-refill x1-1 p.o. daily.  Follow-up in 6 months for next med check.  Do recommend he continue to try to lose 10-15 pounds in weight by watching his calories and continued physical activity.    Subjective: 68-year-old male presenting for follow-up on hypertension.  He denies any headaches or dizziness or leg swelling.  He has the multiple sclerosis and it has been stable.  He follows with Dr. Bautista every 6 months regarding the chronic lymphocytic leukemia.  No new issues.  He does not notice any trouble tolerating the amlodipine.    No past medical history on file.  Allergies   Allergen Reactions     Venom-Honey Bee      Current Outpatient Medications   Medication Sig Dispense Refill     acetaminophen-codeine (TYLENOL #3) 300-30 mg per tablet 1 tablet.       amLODIPine (NORVASC) 5 MG tablet Take 1 tablet (5 mg total) by mouth daily. 90 tablet 1     budesonide (ENTOCORT EC) 3 mg 24 hr capsule TAKE 3 CAPSULES BY MOUTH DAILY FOR 6 WEEKS, THEN 2 CAPS FOR 2 WEEKS, THEN 1 CAP DAILY  3     calcium carbonate-vitamin D3 500 mg(1,250mg) -400 unit Tab Take by mouth.       CHOLECALCIFEROL, VITAMIN D3, (VITAMIN D3 ORAL) Take 2,000 Units by mouth daily.       cyanocobalamin (VITAMIN B-12) 250 MCG tablet Take 250 mcg by mouth daily.       magnesium 250 mg Tab Take 2 tablets by mouth daily.       OMEGA-3 FATTY ACIDS (FISH OIL CONCENTRATE ORAL) Take 1,250 mg by mouth daily.       terazosin (HYTRIN) 5 MG capsule   2     No current facility-administered medications for this visit.      All other review of systems negative for any other changes.    Objective:/60 (Patient Site: Left Arm, Patient Position: Sitting, Cuff Size: Adult Large)   Pulse 73   Resp 16   Wt 204 lb (92.5 kg)   SpO2 97%   BMI 26.91 kg/m    HEENT examination is negative.  Neck  supple.  Lungs clear.  Heart regular rate and rhythm.

## 2021-06-21 NOTE — PROGRESS NOTES
NEUROLOGY FOLLOW UP VISIT  NOTE       [facility] [place]  [address] [place] [address]  Tel: [phone]  Fax: [phone]  [email or web address]     Shivam Jansen,  1950, MRN [id number]  PCP: Ibeth Pepper  Date: 2021      ASSESSMENT & PLAN     Diagnosis code  1. Multiple sclerosis (H)     Multiple sclerosis  71-year-old male with history of multiple sclerosis who returns for follow-up.  He was diagnosed in  and initially was labeled as primary progressive but his condition has remained stable over the year in spite of being not on any medication.  His most recent MRI was in 2018 and did not show any significant change.  We discussed the option of repeating MRI brain to ensure stability but he does not think it is needed as his condition has remained stable and would rather continue on Tylenol with codeine as needed for muscle spasm.  If he develops any clinical symptoms he will be open to the idea of repeating MRI scan.  Regular follow-up will be in 1 year.    Thank you again for this referral, please feel free to contact me if you have any questions.    Giorgio Weaver MD  [facility]  (Formerly, [facility]     HISTORY OF PRESENT ILLNESS     Patient is a 71-year-old male with history of chronic lymphocytic leukemia, benign prostatic hypertrophy, microscopic colitis who was previously followed in our clinic by Dr. Pankaj Oliveira and was diagnosed with primary progressive multiple sclerosis who returns for yearly follow-up.  This was diagnosed in  and since then he has remained relatively stable.  MRI was repeated in  and again in 2018 that showed no change and he was not started on any immunomodulators therapy.  Patient developed some hand weakness and had an EMG that showed C7 and C8 nerve root involvement but his cervical spine MRI was unchanged.  Patient denies any bladder  incontinence, gait instability, focal weakness.  He usually takes Tylenol with codeine that helps his fatigue and is requesting refill.  Since his last visit he denies any flareup.  We discussed the option of repeating MRI scan but he does not think it is needed as he has remained symptom-free and is not sure with his age he would be interested in starting immunomodulators therapy     PROBLEM LIST   Patient Active Problem List   Diagnosis Code     Multiple sclerosis (H) G35     Collagenous colitis K52.831     Chronic pain G89.29     Chronic lymphocytic leukemia (H) C91.10     Benign essential hypertension I10     Benign prostatic hyperplasia N40.0         PAST MEDICAL & SURGICAL HISTORY     Past Medical History:   Patient  has no past medical history on file.    Surgical History:  He  has a past surgical history that includes meniscal tear.     SOCIAL HISTORY     Reviewed, and he  reports that he has quit smoking. He has never used smokeless tobacco. He reports current alcohol use.     FAMILY HISTORY     Reviewed, and family history includes Cancer in his mother.     ALLERGIES     Allergies   Allergen Reactions     Bee Venom          REVIEW OF SYSTEMS     A 12 point review of system was performed and was negative except as outlined in the history of present illness.     HOME MEDICATIONS     Current Outpatient Rx   Medication Sig Dispense Refill     acetaminophen-codeine (TYLENOL #3) 300-30 MG tablet Take 1 tablet by mouth every 6 hours as needed for severe pain 120 tablet 5     amLODIPine (NORVASC) 5 MG tablet Take 5 mg by mouth       budesonide (ENTOCORT EC) 3 MG EC capsule Take 6 mg by mouth every morning       tamsulosin (FLOMAX) 0.4 MG capsule TAKE 1 CAPSULE (0.4 MG TOTAL) BY MOUTH DAILY AFTER SUPPER.           PHYSICAL EXAM     Vital signs  /66 (BP Location: Right arm, Patient Position: Sitting)   Pulse 64   Ht 1.829 m (6')   Wt 92.5 kg (204 lb)   BMI 27.67 kg/m      Weight:   204 lbs 0 oz    General  physical exam reveals middle-age somewhat talkative gentleman who is alert and oriented ×4 vital signs were reviewed and are documented in electronic medical record. Neck was supple no carotid bruit thyromegaly JVD or lymphadenopathy noted. Speech was normal. Cranial nerves II through XII are intact there was no intranuclear ophthalmoplegia. Motor strength 5/5 except right upper extremity elbow extension was 4+/5. Right lower extremity 5-/5 and ankle 3/5. Reflexes were 2+ toes were downgoing. No dysmetria noted on finger-nose testing. He has a limping gait. Romberg negative.     DIAGNOSTIC STUDIES     PERTINENT RADIOLOGY  Following imaging studies were reviewed:     MRI HEAD 8/30/17  Comparison now made with brain MRI from 2010. T2 white matter lesions are not significantly changed in size, number and morphology from the previous study. No new abnormal areas of enhancement demonstrated.   1. Cerebral white matter lesions most consistent with history of  multiple sclerosis/demyelinating plaques.  2. No abnormal enhancement to suggest active areas of demyelination.  3. No acute intracranial lesion with no hemorrhage, mass or  cortical-based infarct.    MRI C SPINE 8/30/17  Comparison now made with previous cervical MRI in 2010. Multifocal T2 signal abnormality present within the cervical and upper thoracic cord on both studies. There does appear to be increased T2 signalalteration within the upper cervical cord at the C1-2 level as compared to prior study. Again, no areas of active the enhancement with background of diffuse cord volume loss.  1. Findings consistent with chronic demyelination involving the  cervical cord with no active areas of enhancement at this time.  2. Multilevel severe or moderate severe foraminal stenosis at levels  reported above. Also, multilevel facet arthropathy.  3. Moderate central stenosis with cord volume loss at C4-5 and C5-6.     MRI Head & C Spine done on 8/19/2010  1. Again seen are  the multiple small lesions within the white matter of   both cerebral hemispheres, many of which have a periventricular   distribution. Imaging features again compatible with multiple   sclerosis. There are several new small areas of demyelination that   have developed in the cerebral white matter since 11/06. None of these   enhance following IV contrast material. Relative sparing of the   brainstem and cerebellum.  2. No new infarct, hemorrhage, hydrocephalus, or intracranial mass   lesion.  3. Minimal generalized cerebral and cerebellar atrophy.  4. Minimal mucosal thickening in the maxillary and ethmoid sinuses.     MRI CERVICAL SPINE:  1. There are multiple small areas of presumed chronic demyelination   within the cervical cord. None of these enhance to suggest active   demyelination.  2. Loss of the normal cervical lordosis centered at the C4-C6 levels   likely positional and degenerative.  3. Mild to moderate degenerative changes in the cervical disks.   Advanced degenerative facet arthropathy at the C3-4 level with slight   anterior subluxation of C3 on C4 due to the facet changes.  4. No high-grade central spinal canal narrowing in the cervical region.  5. At the C3-4 level there is severe narrowing of the right C3-4   foramen due to marked right-sided facet hypertrophy and spurring.  6. At the C6-7 level there is moderate narrowing of the left C6-7   foramen, mild narrowing of the right C6-7 foramen due to bony   spurring.  7. Moderate to severe narrowing of the right C2-3 foramen due to   right-sided facet hypertrophy and uncinate spurring     PERTINENT LABS  Following labs were reviewed:  Office Visit - [facility] on 05/26/2021   Component Date Value     WBC 05/26/2021 48.3*     RBC Count 05/26/2021 3.83*     Hemoglobin 05/26/2021 11.1*     Hematocrit 05/26/2021 34.5*     MCV 05/26/2021 90      MCH 05/26/2021 29.0      MCHC 05/26/2021 32.2      RDW 05/26/2021 15.1*     Platelet Count 05/26/2021 195       Mean Platelet Volume 05/26/2021 9.9      Sodium 05/26/2021 133*     Potassium 05/26/2021 4.3      Chloride 05/26/2021 98      Carbon Dioxide (CO2) 05/26/2021 27      Anion Gap 05/26/2021 8      Glucose 05/26/2021 93      Calcium 05/26/2021 9.7      Urea Nitrogen 05/26/2021 9      Creatinine 05/26/2021 0.84      GFR Estimate If Black 05/26/2021 >60      GFR Estimate 05/26/2021 >60          Total time spent for face to face visit, reviewing labs/imaging studies, counseling and coordination of care was: 30 Minutes spent on the date of the encounter doing chart review, review of outside records, review of test results, interpretation of tests, patient visit and documentation       This note was dictated using voice recognition software.  Any grammatical or context distortions are unintentional and inherent to the software.    No orders of the defined types were placed in this encounter.     New Prescriptions    No medications on file     Modified Medications    Modified Medication Previous Medication    ACETAMINOPHEN-CODEINE (TYLENOL #3) 300-30 MG TABLET acetaminophen-codeine (TYLENOL #3) 300-30 MG tablet       Take 1 tablet by mouth every 6 hours as needed for severe pain    Take 1 tablet by mouth every 6 hours as needed for severe pain

## 2021-06-21 NOTE — LETTER
2021         RE: Shivam Jansen  [address] [facility] [address]        Dear Colleague,    Thank you for referring your patient, Shivam Jansen, to the [facility] [place]. Please see a copy of my visit note below.    NEUROLOGY FOLLOW UP VISIT  NOTE       [facility] [place]  [address] [place] [address]  Tel: [phone]  Fax: [phone]  [email or web address]     Shivam Jansen,  1950, MRN [id number]  PCP: Ibeth Pepper  Date: 2021      ASSESSMENT & PLAN     Diagnosis code  1. Multiple sclerosis (H)     Multiple sclerosis  71-year-old male with history of multiple sclerosis who returns for follow-up.  He was diagnosed in  and initially was labeled as primary progressive but his condition has remained stable over the year in spite of being not on any medication.  His most recent MRI was in 2018 and did not show any significant change.  We discussed the option of repeating MRI brain to ensure stability but he does not think it is needed as his condition has remained stable and would rather continue on Tylenol with codeine as needed for muscle spasm.  If he develops any clinical symptoms he will be open to the idea of repeating MRI scan.  Regular follow-up will be in 1 year.    Thank you again for this referral, please feel free to contact me if you have any questions.    Giorgio Weaver MD  [facility]  (Formerly, [facility]     HISTORY OF PRESENT ILLNESS     Patient is a 71-year-old male with history of chronic lymphocytic leukemia, benign prostatic hypertrophy, microscopic colitis who was previously followed in our clinic by Dr. Pankaj Oliveira and was diagnosed with primary progressive multiple sclerosis who returns for yearly follow-up.  This was diagnosed in  and since then he has remained relatively stable.  MRI was repeated in  and again in 2018 that  showed no change and he was not started on any immunomodulators therapy.  Patient developed some hand weakness and had an EMG that showed C7 and C8 nerve root involvement but his cervical spine MRI was unchanged.  Patient denies any bladder incontinence, gait instability, focal weakness.  He usually takes Tylenol with codeine that helps his fatigue and is requesting refill.  Since his last visit he denies any flareup.  We discussed the option of repeating MRI scan but he does not think it is needed as he has remained symptom-free and is not sure with his age he would be interested in starting immunomodulators therapy     PROBLEM LIST   Patient Active Problem List   Diagnosis Code     Multiple sclerosis (H) G35     Collagenous colitis K52.831     Chronic pain G89.29     Chronic lymphocytic leukemia (H) C91.10     Benign essential hypertension I10     Benign prostatic hyperplasia N40.0         PAST MEDICAL & SURGICAL HISTORY     Past Medical History:   Patient  has no past medical history on file.    Surgical History:  He  has a past surgical history that includes meniscal tear.     SOCIAL HISTORY     Reviewed, and he  reports that he has quit smoking. He has never used smokeless tobacco. He reports current alcohol use.     FAMILY HISTORY     Reviewed, and family history includes Cancer in his mother.     ALLERGIES     Allergies   Allergen Reactions     Bee Venom          REVIEW OF SYSTEMS     A 12 point review of system was performed and was negative except as outlined in the history of present illness.     HOME MEDICATIONS     Current Outpatient Rx   Medication Sig Dispense Refill     acetaminophen-codeine (TYLENOL #3) 300-30 MG tablet Take 1 tablet by mouth every 6 hours as needed for severe pain 120 tablet 5     amLODIPine (NORVASC) 5 MG tablet Take 5 mg by mouth       budesonide (ENTOCORT EC) 3 MG EC capsule Take 6 mg by mouth every morning       tamsulosin (FLOMAX) 0.4 MG capsule TAKE 1 CAPSULE (0.4 MG TOTAL) BY  MOUTH DAILY AFTER SUPPER.           PHYSICAL EXAM     Vital signs  /66 (BP Location: Right arm, Patient Position: Sitting)   Pulse 64   Ht 1.829 m (6')   Wt 92.5 kg (204 lb)   BMI 27.67 kg/m      Weight:   204 lbs 0 oz    General physical exam reveals middle-age somewhat talkative gentleman who is alert and oriented ×4 vital signs were reviewed and are documented in electronic medical record. Neck was supple no carotid bruit thyromegaly JVD or lymphadenopathy noted. Speech was normal. Cranial nerves II through XII are intact there was no intranuclear ophthalmoplegia. Motor strength 5/5 except right upper extremity elbow extension was 4+/5. Right lower extremity 5-/5 and ankle 3/5. Reflexes were 2+ toes were downgoing. No dysmetria noted on finger-nose testing. He has a limping gait. Romberg negative.     DIAGNOSTIC STUDIES     PERTINENT RADIOLOGY  Following imaging studies were reviewed:     MRI HEAD 8/30/17  Comparison now made with brain MRI from 2010. T2 white matter lesions are not significantly changed in size, number and morphology from the previous study. No new abnormal areas of enhancement demonstrated.   1. Cerebral white matter lesions most consistent with history of  multiple sclerosis/demyelinating plaques.  2. No abnormal enhancement to suggest active areas of demyelination.  3. No acute intracranial lesion with no hemorrhage, mass or  cortical-based infarct.    MRI C SPINE 8/30/17  Comparison now made with previous cervical MRI in 2010. Multifocal T2 signal abnormality present within the cervical and upper thoracic cord on both studies. There does appear to be increased T2 signalalteration within the upper cervical cord at the C1-2 level as compared to prior study. Again, no areas of active the enhancement with background of diffuse cord volume loss.  1. Findings consistent with chronic demyelination involving the  cervical cord with no active areas of enhancement at this time.  2.  Multilevel severe or moderate severe foraminal stenosis at levels  reported above. Also, multilevel facet arthropathy.  3. Moderate central stenosis with cord volume loss at C4-5 and C5-6.     MRI Head & C Spine done on 8/19/2010  1. Again seen are the multiple small lesions within the white matter of   both cerebral hemispheres, many of which have a periventricular   distribution. Imaging features again compatible with multiple   sclerosis. There are several new small areas of demyelination that   have developed in the cerebral white matter since 11/06. None of these   enhance following IV contrast material. Relative sparing of the   brainstem and cerebellum.  2. No new infarct, hemorrhage, hydrocephalus, or intracranial mass   lesion.  3. Minimal generalized cerebral and cerebellar atrophy.  4. Minimal mucosal thickening in the maxillary and ethmoid sinuses.     MRI CERVICAL SPINE:  1. There are multiple small areas of presumed chronic demyelination   within the cervical cord. None of these enhance to suggest active   demyelination.  2. Loss of the normal cervical lordosis centered at the C4-C6 levels   likely positional and degenerative.  3. Mild to moderate degenerative changes in the cervical disks.   Advanced degenerative facet arthropathy at the C3-4 level with slight   anterior subluxation of C3 on C4 due to the facet changes.  4. No high-grade central spinal canal narrowing in the cervical region.  5. At the C3-4 level there is severe narrowing of the right C3-4   foramen due to marked right-sided facet hypertrophy and spurring.  6. At the C6-7 level there is moderate narrowing of the left C6-7   foramen, mild narrowing of the right C6-7 foramen due to bony   spurring.  7. Moderate to severe narrowing of the right C2-3 foramen due to   right-sided facet hypertrophy and uncinate spurring     PERTINENT LABS  Following labs were reviewed:  Office Visit - [facility] on 05/26/2021   Component Date Value     WBC  05/26/2021 48.3*     RBC Count 05/26/2021 3.83*     Hemoglobin 05/26/2021 11.1*     Hematocrit 05/26/2021 34.5*     MCV 05/26/2021 90      MCH 05/26/2021 29.0      MCHC 05/26/2021 32.2      RDW 05/26/2021 15.1*     Platelet Count 05/26/2021 195      Mean Platelet Volume 05/26/2021 9.9      Sodium 05/26/2021 133*     Potassium 05/26/2021 4.3      Chloride 05/26/2021 98      Carbon Dioxide (CO2) 05/26/2021 27      Anion Gap 05/26/2021 8      Glucose 05/26/2021 93      Calcium 05/26/2021 9.7      Urea Nitrogen 05/26/2021 9      Creatinine 05/26/2021 0.84      GFR Estimate If Black 05/26/2021 >60      GFR Estimate 05/26/2021 >60          Total time spent for face to face visit, reviewing labs/imaging studies, counseling and coordination of care was: 30 Minutes spent on the date of the encounter doing chart review, review of outside records, review of test results, interpretation of tests, patient visit and documentation       This note was dictated using voice recognition software.  Any grammatical or context distortions are unintentional and inherent to the software.    No orders of the defined types were placed in this encounter.     New Prescriptions    No medications on file     Modified Medications    Modified Medication Previous Medication    ACETAMINOPHEN-CODEINE (TYLENOL #3) 300-30 MG TABLET acetaminophen-codeine (TYLENOL #3) 300-30 MG tablet       Take 1 tablet by mouth every 6 hours as needed for severe pain    Take 1 tablet by mouth every 6 hours as needed for severe pain                     Again, thank you for allowing me to participate in the care of your patient.        Sincerely,        Giorgio Weaver MD

## 2021-06-22 NOTE — PROGRESS NOTES
"Zaira from lab called with \"critical WBC=39.3\".  She also gave me \"Hgb=11.5, Ymh=801, Prelim ANC=5.6\". Dr. Bautista notified of these results/KIKO Leonard RN   "

## 2021-06-23 NOTE — PROGRESS NOTES
[facility] Hematology and Oncology Progress Note    Patient: Shivam Jansen  MRN: [id number]  Date of Service: January 7, 2019      Assessment and Plan:    1.  CLL: Clinically doing well.  No symptoms.  His counts were reviewed and are stable.  Lymphocyte count is not increasing.  Is the same as it was about a year ago.  Hemoglobin and platelets remained stable.  No indications for treatment.  We will see him back in 6 months.  He will let us know if he develops any new symptoms.    2.  Immunizations:  Pneumo Conj 13-V on  7/11/2018.  Pneumo Polysac 23-V on 12/14/2015     ECOG Performance   ECOG Performance Status: 0    Distress Assessment  Distress Assessment Score: No distress    Pain  Currently in Pain: No/denies    Diagnosis:    Chronic lymphocytic leukemia. CHEEK stage 0. Diagnosed 5/2010. Cytogenetic shows a 13q (better prognosis).    Treatment:    Observation to date.    Interim History:    Shayan returns today for a follow-up visit.  Was last seen about 6 months ago.  Been doing well in the interim.  No fevers, chills, night sweats.  Energy and appetite have been good.  No pain.  He has not noted any palpable lumps or bumps.    Review of Systems:    Constitutional  Constitutional (WDL): All constitutional elements are within defined limits  Neurosensory  Neurosensory (WDL): All neurosensory elements are within defined limits  Cardiovascular  Cardiovascular (WDL): All cardiovascular elements are within defined limits  Pulmonary  Respiratory (WDL): Within Defined Limits  Gastrointestinal  Gastrointestinal (WDL): All gastrointestinal elements are within defined limits  Genitourinary  Genitourinary (WDL): All genitourinary elements are within defined limits  Integumentary  Integumentary (WDL): All integumentary elements are within defined limits  Patient Coping  Patient Coping: Accepting;Open/discussion  Accompanied by  Accompanied by: Family Member    Past History:    No past medical history on file.     Physical  Exam:    Recent Vitals 1/7/2019   Height -   Weight 207 lbs 2 oz   BSA (m2) 2.2 m2   /65   Pulse 68   Temp 97.6   Temp src 1   SpO2 98   Some recent data might be hidden     General: patient appears stated age of 68 y.o.. Nontoxic and in no distress.   HEENT: Head: atraumatic, normocephalic. Sclerae anicteric.  Chest:  Normal respiratory effort.  Clear to auscultation bilaterally.  Cardiac:  No edema.  Regular rate and rhythm.  No murmur.  Abdomen: abdomen is soft, non-distended.   Extremities: normal tone and muscle bulk.  Skin: no lesions or rash. Warm and dry.   CNS: alert and oriented. Grossly non-focal.   Psychiatric: normal mood and affect.   Lymph nodes:  No palpable cervical or supraclavicular nodes.    Lab Results:    Results for LAZARUS GONSALVES (MRN [id number]) as of 2/4/2019 10:12   Ref. Range 1/7/2019 13:07   Sodium Latest Ref Range: 136 - 145 mmol/L 135 (L)   Potassium Latest Ref Range: 3.5 - 5.0 mmol/L 4.2   Chloride Latest Ref Range: 98 - 107 mmol/L 101   CO2 Latest Ref Range: 22 - 31 mmol/L 30   Anion Gap, Calculation Latest Ref Range: 5 - 18 mmol/L 4 (L)   BUN Latest Ref Range: 8 - 22 mg/dL 10   Creatinine Latest Ref Range: 0.70 - 1.30 mg/dL 0.80   GFR MDRD Af Amer Latest Ref Range: >60 mL/min/1.73m2 >60   GFR MDRD Non Af Amer Latest Ref Range: >60 mL/min/1.73m2 >60   Calcium Latest Ref Range: 8.5 - 10.5 mg/dL 9.8   AST Latest Ref Range: 0 - 40 U/L 15   ALT Latest Ref Range: 0 - 45 U/L 15   ALBUMIN Latest Ref Range: 3.5 - 5.0 g/dL 3.8   Protein, Total Latest Ref Range: 6.0 - 8.0 g/dL 6.4   Alkaline Phosphatase Latest Ref Range: 45 - 120 U/L 84   Bilirubin, Total Latest Ref Range: 0.0 - 1.0 mg/dL 0.4   Glucose Latest Ref Range: 70 - 125 mg/dL 100   WBC Latest Ref Range: 4.0 - 11.0 thou/uL 39.3 (HH)   RBC Latest Ref Range: 4.40 - 6.20 mill/uL 4.04 (L)   Hemoglobin Latest Ref Range: 14.0 - 18.0 g/dL 11.5 (L)   Hematocrit Latest Ref Range: 40.0 - 54.0 % 36.7 (L)   MCV Latest Ref Range: 80 - 100 fL  91   MCH Latest Ref Range: 27.0 - 34.0 pg 28.5   MCHC Latest Ref Range: 32.0 - 36.0 g/dL 31.3 (L)   RDW Latest Ref Range: 11.0 - 14.5 % 14.0   Platelets Latest Ref Range: 140 - 440 thou/uL 184   MPV Latest Ref Range: 8.5 - 12.5 fL 9.0   Total Neutrophils % Latest Ref Range: 50 - 70 % 14 (L)   Lymphocytes % Latest Ref Range: 20 - 40 % 81 (H)   Monocytes % Latest Ref Range: 2 - 10 % 5   Eosinophils %  Latest Ref Range: 0 - 6 % 0   Basophils % Latest Ref Range: 0 - 2 % 0   Total Neutrophils Absolute Latest Ref Range: 2.0 - 7.7 thou/ul 5.5   Lymphocytes Absolute Latest Ref Range: 0.8 - 4.4 thou/uL 31.8 (H)   Monocytes Absolute Latest Ref Range: 0.0 - 0.9 thou/uL 2.0 (H)   Eosinophils Absolute Latest Ref Range: 0.0 - 0.4 thou/uL 0.0   Basophils Absolute Latest Ref Range: 0.0 - 0.2 thou/uL 0.0     Imaging:    No results found.      Signed by: Marco Bautista MD

## 2021-06-25 NOTE — PROGRESS NOTES
S:  Patient presents for annual visit.  He has multiple sclerosis is doing well with no concerns.  He continues to have right knee pain and a history of meniscus tear.  He is seeing some orthopedics and having this treated conservatively without surgery.  He occasionally has right knee swelling.    His dental exams are up-to-date, he is sleeping well other than getting up occasionally at night.    Blood pressure well controlled on amlodipine.    BPH well-controlled on tamsulosin.    Medications: Tylenol, amlodipine, Entocort, magnesium, omega-3 fatty acids, Flomax    O:   Blood pressure 128/62, pulse 82 respirations 16  Constitutional:    --Vitals as above  --No acute distress  Eyes-  --Sclera noninjected  --Lids and conjunctiva normal  ENT-  --TMs clear  --Sclera noninjected  --Pharynx not erythematous  Neck-  --Neck supple with no cervical lymphadenopathy  Lungs-  --Clear to Auscultation  Heart-  --Regular rate and rhythm  Abdomen--  --Soft, non-tender, non-distended  Skin-  --Pink and dry  Psych-  --Alert and oriented  --Normal affect      A:   Multiple sclerosis  Right knee meniscus tear  Hypertension  BPH    P:   Amlodipine  Continue tamsulosin  CBC BMP lipid today  Encourage dental visits  Follow-up with orthopedics concerning meniscus tear  Follow-up with Dr. Bautista as needed  RTC 1 year

## 2021-06-25 NOTE — TELEPHONE ENCOUNTER
Refill Approved    Rx renewed per Medication Renewal Policy. Medication was last renewed on 11/14/2018.           Donald Nix, [facility] Triage/Med Refill 3/15/2019     Requested Prescriptions   Pending Prescriptions Disp Refills     amLODIPine (NORVASC) 5 MG tablet 90 tablet 1     Sig: Take 1 tablet (5 mg total) by mouth daily.    Calcium-Channel Blockers Protocol Passed - 3/12/2019  9:40 PM       Passed - PCP or prescribing provider visit in past 12 months or next 3 months    Last office visit with prescriber/PCP: 11/14/2018 Rock Hicks MD OR same dept: 11/14/2018 Rock Hicks MD OR same specialty: 11/14/2018 Rock Hicks MD  Last physical: Visit date not found Last MTM visit: Visit date not found   Next visit within 3 mo: Visit date not found  Next physical within 3 mo: Visit date not found  Prescriber OR PCP: Rock Hicks MD  Last diagnosis associated with med order: 1. Benign essential hypertension  - amLODIPine (NORVASC) 5 MG tablet; Take 1 tablet (5 mg total) by mouth daily.  Dispense: 90 tablet; Refill: 1    If protocol passes may refill for 12 months if within 3 months of last provider visit (or a total of 15 months).            Passed - Blood pressure filed in past 12 months    BP Readings from Last 1 Encounters:   01/07/19 141/65

## 2021-07-04 NOTE — LETTER
Letter by Ibeth Pepper MD at      Author: Ibeth Pepper MD Service: -- Author Type: --    Filed:  Encounter Date: 6/1/2021 Status: (Other)         Shivam Jansen  [address] [facility] [address]            June 1, 2021        Dear Mr. Jansen,        Below are the results from your recent visit:    Resulted Orders   HM2(CBC w/o Differential)   Result Value Ref Range    WBC 48.3 (HH) 4.0 - 11.0 thou/uL    RBC 3.83 (L) 4.40 - 6.20 mill/uL    Hemoglobin 11.1 (L) 14.0 - 18.0 g/dL    Hematocrit 34.5 (L) 40.0 - 54.0 %    MCV 90 80 - 100 fL    MCH 29.0 27.0 - 34.0 pg    MCHC 32.2 32.0 - 36.0 g/dL    RDW 15.1 (H) 11.0 - 14.5 %    Platelets 195 140 - 440 thou/uL    MPV 9.9 8.5 - 12.5 fL   Basic Metabolic Panel   Result Value Ref Range    Sodium 133 (L) 136 - 145 mmol/L    Potassium 4.3 3.5 - 5.0 mmol/L    Chloride 98 98 - 107 mmol/L    CO2 27 22 - 31 mmol/L    Anion Gap, Calculation 8 5 - 18 mmol/L    Glucose 93 70 - 125 mg/dL    Calcium 9.7 8.5 - 10.5 mg/dL    BUN 9 8 - 28 mg/dL    Creatinine 0.84 0.70 - 1.30 mg/dL    GFR MDRD Af Amer >60 >60 mL/min/1.73m2    GFR MDRD Non Af Amer >60 >60 mL/min/1.73m2    Narrative    Fasting Glucose reference range is 70-99 mg/dL per  American Diabetes Association (ADA) guidelines.   Lipid Cascade   Result Value Ref Range    Cholesterol 185 <=199 mg/dL    Triglycerides 40 <=149 mg/dL    HDL Cholesterol 88 >=40 mg/dL    LDL Calculated 89 <=129 mg/dL    Patient Fasting > 8hrs? Yes    Glycosylated Hemoglobin A1c   Result Value Ref Range    Hemoglobin A1c 5.3 <=5.6 %         Shayan, your laboratory results are about the same as last year.  Keep up the healthy lifestyle.  You should plan to return in 1 year for recheck.    Sincerely,        SAPPHIRE Pepper MD, MUMTAZ  [facility]  [phone]

## 2021-07-05 ENCOUNTER — RECORDS - HEALTHEAST (OUTPATIENT)
Dept: ADMINISTRATIVE | Facility: OTHER | Age: 71
End: 2021-07-05

## 2021-07-05 ENCOUNTER — OFFICE VISIT - HEALTHEAST (OUTPATIENT)
Dept: ONCOLOGY | Facility: CLINIC | Age: 71
End: 2021-07-05

## 2021-07-05 ENCOUNTER — AMBULATORY - HEALTHEAST (OUTPATIENT)
Dept: INFUSION THERAPY | Facility: CLINIC | Age: 71
End: 2021-07-05

## 2021-07-05 DIAGNOSIS — C91.10 CHRONIC LYMPHOCYTIC LEUKEMIA (H): ICD-10-CM

## 2021-07-05 LAB
ALBUMIN SERPL-MCNC: 4 G/DL (ref 3.5–5)
ALP SERPL-CCNC: 86 U/L (ref 45–120)
ALT SERPL W P-5'-P-CCNC: 11 U/L (ref 0–45)
ANION GAP SERPL CALCULATED.3IONS-SCNC: 11 MMOL/L (ref 5–18)
AST SERPL W P-5'-P-CCNC: 13 U/L (ref 0–40)
BASOPHILS # BLD AUTO: 0 THOU/UL (ref 0–0.2)
BASOPHILS NFR BLD AUTO: 0 % (ref 0–2)
BILIRUB SERPL-MCNC: 0.6 MG/DL (ref 0–1)
BUN SERPL-MCNC: 9 MG/DL (ref 8–28)
CALCIUM SERPL-MCNC: 9.9 MG/DL (ref 8.5–10.5)
CHLORIDE BLD-SCNC: 97 MMOL/L (ref 98–107)
CO2 SERPL-SCNC: 25 MMOL/L (ref 22–31)
CREAT SERPL-MCNC: 0.77 MG/DL (ref 0.7–1.3)
EOSINOPHIL COUNT (ABSOLUTE): 0 THOU/UL (ref 0–0.4)
EOSINOPHIL NFR BLD AUTO: 0 % (ref 0–6)
ERYTHROCYTE [DISTWIDTH] IN BLOOD BY AUTOMATED COUNT: 14.4 % (ref 11–14.5)
GFR SERPL CREATININE-BSD FRML MDRD: >60 ML/MIN/1.73M2
GLUCOSE BLD-MCNC: 92 MG/DL (ref 70–125)
HCT VFR BLD AUTO: 35.5 % (ref 40–54)
HGB BLD-MCNC: 11.2 G/DL (ref 14–18)
IMMATURE GRANULOCYTE % - MAN (DIFF): 0 %
IMMATURE GRANULOCYTE ABSOLUTE - MAN (DIFF): 0 THOU/UL
LYMPHOCYTES # BLD AUTO: 41.9 THOU/UL (ref 0.8–4.4)
LYMPHOCYTES NFR BLD AUTO: 90 % (ref 20–40)
MCH RBC QN AUTO: 28 PG (ref 27–34)
MCHC RBC AUTO-ENTMCNC: 31.5 G/DL (ref 32–36)
MCV RBC AUTO: 89 FL (ref 80–100)
MONOCYTES # BLD AUTO: 0.5 THOU/UL (ref 0–0.9)
MONOCYTES NFR BLD AUTO: 1 % (ref 2–10)
PLAT MORPH BLD: NORMAL
PLATELET # BLD AUTO: 196 THOU/UL (ref 140–440)
PMV BLD AUTO: 8.6 FL (ref 8.5–12.5)
POTASSIUM BLD-SCNC: 4.4 MMOL/L (ref 3.5–5)
PROT SERPL-MCNC: 6.6 G/DL (ref 6–8)
RBC # BLD AUTO: 4 MILL/UL (ref 4.4–6.2)
REACTIVE LYMPHS: ABNORMAL
SODIUM SERPL-SCNC: 133 MMOL/L (ref 136–145)
TOTAL NEUTROPHILS-ABS(DIFF): 4.2 THOU/UL (ref 2–7.7)
TOTAL NEUTROPHILS-REL(DIFF): 9 % (ref 50–70)
WBC: 46.6 THOU/UL (ref 4–11)

## 2021-07-06 VITALS
OXYGEN SATURATION: 95 % | TEMPERATURE: 97.5 F | HEART RATE: 67 BPM | SYSTOLIC BLOOD PRESSURE: 154 MMHG | DIASTOLIC BLOOD PRESSURE: 66 MMHG | OXYGEN SATURATION: 97 % | HEART RATE: 63 BPM | DIASTOLIC BLOOD PRESSURE: 67 MMHG | SYSTOLIC BLOOD PRESSURE: 140 MMHG

## 2021-07-06 VITALS — WEIGHT: 203.8 LBS | BODY MASS INDEX: 27.64 KG/M2

## 2021-07-12 NOTE — PROGRESS NOTES
Progress Notes by Huma Villar, RN at 7/5/2021  1:15 PM     Author: Huma Villar RN Service: -- Author Type: Registered Nurse    Filed: 7/11/2021  8:38 PM Encounter Date: 7/5/2021 Status: Signed    : Huma Villar RN (Registered Nurse)     Summary: Critical lab      Tatiana paged a critical lab value for Shayan, WBC 46.6 and I asked for ANC which she reported at 7.0. I passed this along to Dr. Bautista. Ni, RN, OCN, CBCN

## 2021-07-12 NOTE — PROGRESS NOTES
"Progress Notes by Aleksandra Leonard RN at 7/5/2021  1:15 PM     Author: Aleksandra Leonard RN Service: -- Author Type: Registered Nurse    Filed: 7/11/2021  8:38 PM Encounter Date: 7/5/2021 Status: Signed    : Aleksandra Leonard RN (Registered Nurse)       Oncology Rooming Note    07/05/21 1:30 PM    Shivam Jansen is a 71 y.o. male who presents for:    Chief Complaint   Patient presents with   ? HE Cancer   ? Malignant Hematology       Initial Vitals: /67   Pulse 67   Temp 97.5  F (36.4  C) (Oral)   Wt 203 lb 12.8 oz (92.4 kg)   SpO2 95%   BMI 26.52 kg/m       Estimated body mass index is 26.52 kg/m  as calculated from the following:    Height as of 6/27/19: 6' 1.5\" (1.867 m).    Weight as of this encounter: 203 lb 12.8 oz (92.4 kg).     Body surface area is 2.19 meters squared.      Allergies reviewed: Yes  Medications reviewed: Yes    Refills needed: No    Pharmacy name entered into EPIC: @[email or web address]@      Clinical concerns: no concerns      Aleksandra Leonard RN           "

## 2021-07-12 NOTE — PROGRESS NOTES
Progress Notes by Marco Bautista MD at 7/5/2021  1:15 PM     Author: Marco Bautista MD Service: -- Author Type: Physician    Filed: 7/11/2021  8:38 PM Encounter Date: 7/5/2021 Status: Signed    : Marco Bautista MD (Physician)       [facility] Progress Note    Patient: Shivam Jansen  MRN: [id number]  Date of Service: July 5, 2021      Assessment and Plan:    1.  CLL: Numbers remained quite stable as she gets.  Clinically he is doing well.  No indication for treatment.  We will have him return in 10 months.  Sooner if you develop clinical symptoms.  Continue yearly dermatology evaluation.    2.  Immunizations:  Pneumo Conj 13-V on 7/11/2018.  Pneumo Polysac 23-V on 12/14/2015     ECOG Performance    0    Distress Assessment  Distress Assessment Score: No distress    Pain  Currently in Pain: No/denies    Diagnosis:    Chronic lymphocytic leukemia. CHEEK stage 0. Diagnosed 5/2010. Cytogenetic shows a 13q (better prognosis).    Treatment:    Observation.    Interim History:    Shayan returns today for a follow-up visit.  Continues to do well.  No fatigue, shortness of breath or pain.  Energy and appetite is normal.  No fevers or night sweats.  He does note some bruising on the right dorsal surface of the right arm and hand.    Review of Systems:    As above in the history.     Review of Systems otherwise Negative for:  General: chills, fever or night sweats  Psychological: anxiety or depression  Ophthalmic: blurry vision, double vision or loss of vision, vision change  ENT: epistaxis, oral lesions, hearing changes  Hematological and Lymphatic: bleeding, jaundice, swollen lymph nodes  Endocrine: hot flashes, unexpected weight changes  Respiratory: cough, hemoptysis, orthopnea or shortness of breath/MOSS  Cardiovascular: chest pain, edema, palpitations or PND  Gastrointestinal: abdominal pain, blood in stools, change in bowel habits, constipation, diarrhea or  nausea/vomiting  Genito-Urinary: change in urinary stream, incontinence, frequency/urgency  Musculoskeletal: joint pain, stiffness, swelling, muscle pain  Neurological: dizziness, headaches, numbness/tingling  Dermatological: lumps and rash    Past History:    No past medical history on file.     Physical Exam:    Recent Vitals 7/5/2021   Weight -   BSA (m2) -   /66   Pulse 63   Temp -   Temp src -   SpO2 97   Some recent data might be hidden     General: patient appears stated age of 71 y.o.. Nontoxic and in no distress.   HEENT: Head: atraumatic, normocephalic. Sclerae anicteric.   Chest:  Normal respiratory effort.   Cardiac:  No edema.   Abdomen: abdomen is nondistended.  Extremities: normal tone and muscle bulk.  Skin: no lesions or rash. Warm and dry.  Some scattered ecchymoses on the dorsum of the forearm and hand mostly of the right arm.  CNS: alert and oriented. Grossly non-focal.   Psychiatric: normal mood and affect.   Nodes: No palpable cervical, supraclavicular, or axillary nodes.    Lab Results:    Recent Results (from the past 240 hour(s))   Comprehensive Metabolic Panel    Collection Time: 07/05/21  1:00 PM   Result Value Ref Range    Sodium 133 (L) 136 - 145 mmol/L    Potassium 4.4 3.5 - 5.0 mmol/L    Chloride 97 (L) 98 - 107 mmol/L    CO2 25 22 - 31 mmol/L    Anion Gap, Calculation 11 5 - 18 mmol/L    Glucose 92 70 - 125 mg/dL    BUN 9 8 - 28 mg/dL    Creatinine 0.77 0.70 - 1.30 mg/dL    GFR MDRD Af Amer >60 >60 mL/min/1.73m2    GFR MDRD Non Af Amer >60 >60 mL/min/1.73m2    Bilirubin, Total 0.6 0.0 - 1.0 mg/dL    Calcium 9.9 8.5 - 10.5 mg/dL    Protein, Total 6.6 6.0 - 8.0 g/dL    Albumin 4.0 3.5 - 5.0 g/dL    Alkaline Phosphatase 86 45 - 120 U/L    AST 13 0 - 40 U/L    ALT 11 0 - 45 U/L   HM1 (CBC with Diff)    Collection Time: 07/05/21  1:00 PM   Result Value Ref Range    WBC 46.6 (HH) 4.0 - 11.0 thou/uL    RBC 4.00 (L) 4.40 - 6.20 mill/uL    Hemoglobin 11.2 (L) 14.0 - 18.0 g/dL     Hematocrit 35.5 (L) 40.0 - 54.0 %    MCV 89 80 - 100 fL    MCH 28.0 27.0 - 34.0 pg    MCHC 31.5 (L) 32.0 - 36.0 g/dL    RDW 14.4 11.0 - 14.5 %    Platelets 196 140 - 440 thou/uL    MPV 8.6 8.5 - 12.5 fL   Manual Differential    Collection Time: 07/05/21  1:00 PM   Result Value Ref Range    Total Neutrophils % 9 (L) 50 - 70 %    Lymphocytes % 90 (H) 20 - 40 %    Monocytes % 1 (L) 2 - 10 %    Eosinophils %  0 0 - 6 %    Basophils % 0 0 - 2 %    Immature Granulocyte % - Manual 0 <=0 %    Total Neutrophils Absolute 4.2 2.0 - 7.7 thou/ul    Lymphocytes Absolute 41.9 (H) 0.8 - 4.4 thou/uL    Monocytes Absolute 0.5 0.0 - 0.9 thou/uL    Eosinophils Absolute 0.0 0.0 - 0.4 thou/uL    Basophils Absolute 0.0 0.0 - 0.2 thou/uL    Immature Granulocyte Absolute - Manual 0.0 <=0.0 thou/uL    Platelet Estimate Normal Normal    Reactive Lymphocytes 2+ (!) Negative     Imaging:    No results found.       Signed by: Marco Bautista MD

## 2021-09-20 ENCOUNTER — TRANSFERRED RECORDS (OUTPATIENT)
Dept: HEALTH INFORMATION MANAGEMENT | Facility: CLINIC | Age: 71
End: 2021-09-20

## 2021-12-09 ENCOUNTER — APPOINTMENT (OUTPATIENT)
Dept: URBAN - METROPOLITAN AREA CLINIC 260 | Age: 71
Setting detail: DERMATOLOGY
End: 2021-12-10

## 2021-12-09 VITALS — WEIGHT: 190 LBS | HEIGHT: 72 IN

## 2021-12-09 DIAGNOSIS — L57.0 ACTINIC KERATOSIS: ICD-10-CM

## 2021-12-09 DIAGNOSIS — D22 MELANOCYTIC NEVI: ICD-10-CM

## 2021-12-09 DIAGNOSIS — L81.4 OTHER MELANIN HYPERPIGMENTATION: ICD-10-CM

## 2021-12-09 DIAGNOSIS — L82.0 INFLAMED SEBORRHEIC KERATOSIS: ICD-10-CM

## 2021-12-09 PROBLEM — D22.39 MELANOCYTIC NEVI OF OTHER PARTS OF FACE: Status: ACTIVE | Noted: 2021-12-09

## 2021-12-09 PROCEDURE — OTHER MIPS QUALITY: OTHER

## 2021-12-09 PROCEDURE — OTHER COUNSELING: OTHER

## 2021-12-09 PROCEDURE — 17000 DESTRUCT PREMALG LESION: CPT | Mod: 59

## 2021-12-09 PROCEDURE — 17110 DESTRUCT B9 LESION 1-14: CPT

## 2021-12-09 PROCEDURE — OTHER LIQUID NITROGEN: OTHER

## 2021-12-09 PROCEDURE — 99213 OFFICE O/P EST LOW 20 MIN: CPT | Mod: 25

## 2021-12-09 PROCEDURE — 17003 DESTRUCT PREMALG LES 2-14: CPT | Mod: 59

## 2021-12-09 ASSESSMENT — LOCATION SIMPLE DESCRIPTION DERM
LOCATION SIMPLE: RIGHT EYEBROW
LOCATION SIMPLE: RIGHT FOREHEAD
LOCATION SIMPLE: RIGHT EAR
LOCATION SIMPLE: LEFT CHEEK
LOCATION SIMPLE: LEFT FOREHEAD
LOCATION SIMPLE: LEFT TEMPLE
LOCATION SIMPLE: RIGHT TEMPLE

## 2021-12-09 ASSESSMENT — LOCATION ZONE DERM
LOCATION ZONE: EAR
LOCATION ZONE: FACE

## 2021-12-09 ASSESSMENT — LOCATION DETAILED DESCRIPTION DERM
LOCATION DETAILED: RIGHT CENTRAL TEMPLE
LOCATION DETAILED: LEFT SUPERIOR LATERAL FOREHEAD
LOCATION DETAILED: RIGHT CENTRAL EYEBROW
LOCATION DETAILED: RIGHT FOREHEAD
LOCATION DETAILED: LEFT SUPERIOR FOREHEAD
LOCATION DETAILED: LEFT INFERIOR CENTRAL MALAR CHEEK
LOCATION DETAILED: RIGHT SUPERIOR FOREHEAD
LOCATION DETAILED: LEFT MID TEMPLE
LOCATION DETAILED: RIGHT INFERIOR LATERAL FOREHEAD
LOCATION DETAILED: RIGHT SCAPHA
LOCATION DETAILED: LEFT SUPERIOR LATERAL MALAR CHEEK
LOCATION DETAILED: RIGHT INFERIOR TEMPLE

## 2021-12-09 NOTE — PROCEDURE: LIQUID NITROGEN
Render Post-Care Instructions In Note?: no
Detail Level: Detailed
Medical Necessity Information: It is in your best interest to select a reason for this procedure from the list below. All of these items fulfill various CMS LCD requirements except the new and changing color options.
Show Topical Anesthesia Variable?: Yes
Consent: The patient's consent was obtained including but not limited to risks of crusting, scabbing, blistering, scarring, darker or lighter pigmentary change, recurrence, incomplete removal and infection.
Duration Of Freeze Thaw-Cycle (Seconds): 5-10
Medical Necessity Clause: This procedure was medically necessary because the lesions that were treated were:
Duration Of Freeze Thaw-Cycle (Seconds): 3
Post-Care Instructions: I reviewed with the patient in detail post-care instructions. Patient is to wear sunprotection, and avoid picking at any of the treated lesions. Pt may apply Vaseline to crusted or scabbing areas.
Detail Level: Simple
Number Of Freeze-Thaw Cycles: 2 freeze-thaw cycles
Number Of Freeze-Thaw Cycles: 3 freeze-thaw cycles

## 2021-12-09 NOTE — PROCEDURE: COUNSELING
Sunscreen Recommendations: Patient asked about removal. We can remove this mole but there?s always a chance of them coming back and there?s always a chance for a scar.
Detail Level: Zone

## 2022-01-18 VITALS
WEIGHT: 203.7 LBS | SYSTOLIC BLOOD PRESSURE: 141 MMHG | OXYGEN SATURATION: 99 % | DIASTOLIC BLOOD PRESSURE: 64 MMHG | BODY MASS INDEX: 27.63 KG/M2 | TEMPERATURE: 97.6 F | HEART RATE: 65 BPM

## 2022-01-18 VITALS
HEART RATE: 69 BPM | OXYGEN SATURATION: 99 % | WEIGHT: 203.25 LBS | RESPIRATION RATE: 16 BRPM | DIASTOLIC BLOOD PRESSURE: 64 MMHG | SYSTOLIC BLOOD PRESSURE: 138 MMHG | BODY MASS INDEX: 27.57 KG/M2

## 2022-01-18 VITALS
DIASTOLIC BLOOD PRESSURE: 70 MMHG | BODY MASS INDEX: 27.4 KG/M2 | HEART RATE: 63 BPM | WEIGHT: 202 LBS | TEMPERATURE: 98.2 F | SYSTOLIC BLOOD PRESSURE: 141 MMHG | OXYGEN SATURATION: 96 %

## 2022-01-18 VITALS
OXYGEN SATURATION: 95 % | TEMPERATURE: 97.6 F | SYSTOLIC BLOOD PRESSURE: 130 MMHG | DIASTOLIC BLOOD PRESSURE: 62 MMHG | HEART RATE: 70 BPM | WEIGHT: 209.4 LBS | BODY MASS INDEX: 27.25 KG/M2

## 2022-01-18 VITALS
TEMPERATURE: 97.5 F | BODY MASS INDEX: 27.64 KG/M2 | HEART RATE: 63 BPM | WEIGHT: 203.8 LBS | SYSTOLIC BLOOD PRESSURE: 140 MMHG | DIASTOLIC BLOOD PRESSURE: 66 MMHG | OXYGEN SATURATION: 97 %

## 2022-01-18 VITALS — BODY MASS INDEX: 25.38 KG/M2 | BODY MASS INDEX: 26.03 KG/M2 | WEIGHT: 200 LBS | WEIGHT: 195 LBS

## 2022-01-18 VITALS
WEIGHT: 200 LBS | SYSTOLIC BLOOD PRESSURE: 128 MMHG | HEART RATE: 62 BPM | OXYGEN SATURATION: 98 % | DIASTOLIC BLOOD PRESSURE: 72 MMHG | BODY MASS INDEX: 27.12 KG/M2

## 2022-05-13 ENCOUNTER — TRANSFERRED RECORDS (OUTPATIENT)
Dept: HEALTH INFORMATION MANAGEMENT | Facility: CLINIC | Age: 72
End: 2022-05-13
Payer: COMMERCIAL

## 2022-09-30 ENCOUNTER — TRANSFERRED RECORDS (OUTPATIENT)
Dept: HEALTH INFORMATION MANAGEMENT | Facility: CLINIC | Age: 72
End: 2022-09-30

## 2022-10-18 NOTE — PROGRESS NOTES
[facility] Hematology and Oncology Progress Note    Patient: Shivam Jansen  MRN: [id number]  Date of Service: June 26, 2017      Assessment and Plan:    1.  CLL: Numbers are stable going back almost 4 years.  Clinically he is doing well.  We will continue to follow-up with him in clinic every 6 months.  No indications for treatment.     ECOG Performance   ECOG Performance Status: 0    Distress Assessment  Distress Assessment Score: No distress    Pain  Currently in Pain: No/denies    Diagnosis:    Chronic lymphocytic leukemia. CHEEK stage 0. Diagnosed 5/2010. Cytogenetic shows a 13q.    Treatment:    Observation to date.    Interim History:    Shayan returns today for a follow-up visit.  He was last seen 6 months ago.  No acute complaints.  He was recently diagnosed with collagenous colitis.  Diarrhea has improved with treatment.  No fevers, chills, night sweats.  No unintentional weight loss.  No pain.  Energy and appetite are good.    Review of Systems:    Constitutional  Constitutional (WDL): All constitutional elements are within defined limits  Neurosensory  Neurosensory (WDL): All neurosensory elements are within defined limits  Cardiovascular  Cardiovascular (WDL): All cardiovascular elements are within defined limits  Pulmonary  Respiratory (WDL): Within Defined Limits  Gastrointestinal  Gastrointestinal (WDL): Exceptions to WDL  Anorexia: None  Constipation: None  Diarrhea: Increase of <4 stools per day over baseline, mild increase in ostomy output compared to baseline (loose)  Dysphagia: None  Esophagitis: None  Nausea: None  Pharyngitis: None  Vomiting: None  Dysgeusia: None  Dry Mouth: None  Genitourinary  Genitourinary (WDL): All genitourinary elements are within defined limits  Integumentary  Integumentary (WDL): All integumentary elements are within defined limits  Patient Coping  Patient Coping: Accepting  Accompanied by  Accompanied by: Family Member    Past History:    No past medical history on file.  "    Physical Exam:    Recent Vitals 6/26/2017   Height 6' 0\"   Weight 200 lbs 13 oz   BSA (m2) 2.15 m2   /60   Pulse 66   SpO2 100     General: patient appears stated age of 67 y.o.. Nontoxic and in no distress.   HEENT: Head: atraumatic, normocephalic. Sclerae anicteric.  Chest:  Normal respiratory effort  Cardiac:  No edema.   Abdomen: abdomen is soft, non-distended.  No hepatosplenomegaly.  Extremities: normal tone and muscle bulk.  Skin: no lesions or rash. Warm and dry.   CNS: alert and oriented x3. Grossly non-focal.   Psychiatric: normal mood and affect.   Lymph nodes:  No palpable cervical, supra-clavicular, or axillary nodes bilaterally.    Lab Results:    Recent Results (from the past 168 hour(s))   Comprehensive Metabolic Panel   Result Value Ref Range    Sodium 135 (L) 136 - 145 mmol/L    Potassium 4.2 3.5 - 5.0 mmol/L    Chloride 101 98 - 107 mmol/L    CO2 26 22 - 31 mmol/L    Anion Gap, Calculation 8 5 - 18 mmol/L    Glucose 90 70 - 125 mg/dL    BUN 9 8 - 22 mg/dL    Creatinine 0.92 0.70 - 1.30 mg/dL    GFR MDRD Af Amer >60 >60 mL/min/1.73m2    GFR MDRD Non Af Amer >60 >60 mL/min/1.73m2    Bilirubin, Total 0.5 0.0 - 1.0 mg/dL    Calcium 9.7 8.5 - 10.5 mg/dL    Protein, Total 6.6 6.0 - 8.0 g/dL    Albumin 3.8 3.5 - 5.0 g/dL    Alkaline Phosphatase 86 45 - 120 U/L    AST 15 0 - 40 U/L    ALT 17 0 - 45 U/L   HM1 (CBC with Diff)   Result Value Ref Range    WBC 42.8 (HH) 4.0 - 11.0 thou/uL    RBC 3.95 (L) 4.40 - 6.20 mill/uL    Hemoglobin 11.3 (L) 14.0 - 18.0 g/dL    Hematocrit 35.4 (L) 40.0 - 54.0 %    MCV 90 80 - 100 fL    MCH 28.6 27.0 - 34.0 pg    MCHC 31.9 (L) 32.0 - 36.0 g/dL    RDW 13.5 11.0 - 14.5 %    Platelets 164 140 - 440 thou/uL    MPV 9.4 8.5 - 12.5 fL   Manual Differential   Result Value Ref Range    Total Neutrophils % 12 (L) 50 - 70 %    Lymphocytes % 84 (H) 20 - 40 %    Monocytes % 4 2 - 10 %    Eosinophils %  0 0 - 6 %    Basophils % 0 0 - 2 %    Total Neutrophils Absolute 5.1 2.0 - " 7.7 thou/ul    Lymphocytes Absolute 36.0 (H) 0.8 - 4.4 thou/uL    Monocytes Absolute 1.7 (H) 0.0 - 0.9 thou/uL    Eosinophils Absolute 0.0 0.0 - 0.4 thou/uL    Basophils Absolute 0.0 0.0 - 0.2 thou/uL    Reactive Lymphocytes 1+ (!) Negative    Platelet Estimate Normal Normal     Imaging:    No results found.      Signed by: Marco Bautista MD     (0) age 40 years or less

## 2022-12-05 ENCOUNTER — TRANSFERRED RECORDS (OUTPATIENT)
Dept: HEALTH INFORMATION MANAGEMENT | Facility: CLINIC | Age: 72
End: 2022-12-05

## 2023-01-17 ENCOUNTER — TRANSFERRED RECORDS (OUTPATIENT)
Dept: HEALTH INFORMATION MANAGEMENT | Facility: CLINIC | Age: 73
End: 2023-01-17

## 2023-03-13 ENCOUNTER — LAB REQUISITION (OUTPATIENT)
Dept: LAB | Facility: CLINIC | Age: 73
End: 2023-03-13
Payer: COMMERCIAL

## 2023-03-13 DIAGNOSIS — G35 MULTIPLE SCLEROSIS (H): ICD-10-CM

## 2023-03-13 DIAGNOSIS — J96.20 ACUTE AND CHRONIC RESPIRATORY FAILURE, UNSPECIFIED WHETHER WITH HYPOXIA OR HYPERCAPNIA (H): ICD-10-CM

## 2023-03-13 DIAGNOSIS — M62.81 MUSCLE WEAKNESS (GENERALIZED): ICD-10-CM

## 2023-03-13 LAB
ANION GAP SERPL CALCULATED.3IONS-SCNC: 11 MMOL/L (ref 7–15)
BUN SERPL-MCNC: 18.8 MG/DL (ref 8–23)
CALCIUM SERPL-MCNC: 9.7 MG/DL (ref 8.8–10.2)
CHLORIDE SERPL-SCNC: 103 MMOL/L (ref 98–107)
CREAT SERPL-MCNC: 1.04 MG/DL (ref 0.67–1.17)
DEPRECATED HCO3 PLAS-SCNC: 26 MMOL/L (ref 22–29)
ERYTHROCYTE [DISTWIDTH] IN BLOOD BY AUTOMATED COUNT: 18.3 % (ref 10–15)
GFR SERPL CREATININE-BSD FRML MDRD: 76 ML/MIN/1.73M2
GLUCOSE SERPL-MCNC: 100 MG/DL (ref 70–99)
HCT VFR BLD AUTO: 26.1 % (ref 40–53)
HGB BLD-MCNC: 8 G/DL (ref 13.3–17.7)
HOLD SPECIMEN: NORMAL
MAGNESIUM SERPL-MCNC: 1.8 MG/DL (ref 1.7–2.3)
MCH RBC QN AUTO: 29.4 PG (ref 26.5–33)
MCHC RBC AUTO-ENTMCNC: 30.7 G/DL (ref 31.5–36.5)
MCV RBC AUTO: 96 FL (ref 78–100)
PLATELET # BLD AUTO: 189 10E3/UL (ref 150–450)
POTASSIUM SERPL-SCNC: 3.7 MMOL/L (ref 3.4–5.3)
RBC # BLD AUTO: 2.72 10E6/UL (ref 4.4–5.9)
SODIUM SERPL-SCNC: 140 MMOL/L (ref 136–145)
WBC # BLD AUTO: 33.1 10E3/UL (ref 4–11)

## 2023-03-13 PROCEDURE — 83735 ASSAY OF MAGNESIUM: CPT | Performed by: FAMILY MEDICINE

## 2023-03-13 PROCEDURE — 85027 COMPLETE CBC AUTOMATED: CPT | Performed by: FAMILY MEDICINE

## 2023-03-13 PROCEDURE — 80048 BASIC METABOLIC PNL TOTAL CA: CPT | Performed by: FAMILY MEDICINE

## 2023-03-15 ENCOUNTER — LAB REQUISITION (OUTPATIENT)
Dept: LAB | Facility: CLINIC | Age: 73
End: 2023-03-15
Payer: COMMERCIAL

## 2023-03-15 DIAGNOSIS — C91.10 CHRONIC LYMPHOCYTIC LEUKEMIA OF B-CELL TYPE NOT HAVING ACHIEVED REMISSION (H): ICD-10-CM

## 2023-03-16 LAB
HGB BLD-MCNC: 7.7 G/DL (ref 13.3–17.7)
IRON SERPL-MCNC: 33 UG/DL (ref 61–157)

## 2023-03-16 PROCEDURE — 36415 COLL VENOUS BLD VENIPUNCTURE: CPT | Performed by: FAMILY MEDICINE

## 2023-03-16 PROCEDURE — 85018 HEMOGLOBIN: CPT | Performed by: FAMILY MEDICINE

## 2023-03-16 PROCEDURE — 83540 ASSAY OF IRON: CPT | Performed by: FAMILY MEDICINE

## 2023-03-16 PROCEDURE — P9603 ONE-WAY ALLOW PRORATED MILES: HCPCS | Performed by: FAMILY MEDICINE
